# Patient Record
Sex: FEMALE | Race: WHITE
[De-identification: names, ages, dates, MRNs, and addresses within clinical notes are randomized per-mention and may not be internally consistent; named-entity substitution may affect disease eponyms.]

---

## 2019-01-22 ENCOUNTER — HOSPITAL ENCOUNTER (INPATIENT)
Dept: HOSPITAL 26 - MED | Age: 59
LOS: 1 days | Discharge: NURSING, REHAB OR LONG-TERM CARE | DRG: 919 | End: 2019-01-23
Attending: FAMILY MEDICINE | Admitting: FAMILY MEDICINE
Payer: COMMERCIAL

## 2019-01-22 VITALS — DIASTOLIC BLOOD PRESSURE: 43 MMHG | SYSTOLIC BLOOD PRESSURE: 93 MMHG

## 2019-01-22 VITALS — DIASTOLIC BLOOD PRESSURE: 69 MMHG | SYSTOLIC BLOOD PRESSURE: 133 MMHG

## 2019-01-22 VITALS — BODY MASS INDEX: 26.4 KG/M2 | HEIGHT: 63 IN | WEIGHT: 149 LBS

## 2019-01-22 VITALS — DIASTOLIC BLOOD PRESSURE: 50 MMHG | SYSTOLIC BLOOD PRESSURE: 128 MMHG

## 2019-01-22 VITALS — DIASTOLIC BLOOD PRESSURE: 55 MMHG | SYSTOLIC BLOOD PRESSURE: 116 MMHG

## 2019-01-22 DIAGNOSIS — I13.2: ICD-10-CM

## 2019-01-22 DIAGNOSIS — Z83.3: ICD-10-CM

## 2019-01-22 DIAGNOSIS — Z90.710: ICD-10-CM

## 2019-01-22 DIAGNOSIS — D63.8: ICD-10-CM

## 2019-01-22 DIAGNOSIS — I69.320: ICD-10-CM

## 2019-01-22 DIAGNOSIS — E11.22: ICD-10-CM

## 2019-01-22 DIAGNOSIS — I69.391: ICD-10-CM

## 2019-01-22 DIAGNOSIS — K59.00: ICD-10-CM

## 2019-01-22 DIAGNOSIS — R13.10: ICD-10-CM

## 2019-01-22 DIAGNOSIS — E11.319: ICD-10-CM
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
## 2019-01-22 DIAGNOSIS — I50.43: ICD-10-CM
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
## 2019-01-22 DIAGNOSIS — I69.351: ICD-10-CM

## 2019-01-22 DIAGNOSIS — N17.0: ICD-10-CM

## 2019-01-22 DIAGNOSIS — F32.9: ICD-10-CM

## 2019-01-22 DIAGNOSIS — Z99.2: ICD-10-CM

## 2019-01-22 DIAGNOSIS — Z88.5: ICD-10-CM

## 2019-01-22 DIAGNOSIS — Z79.84: ICD-10-CM

## 2019-01-22 DIAGNOSIS — K21.9: ICD-10-CM

## 2019-01-22 DIAGNOSIS — T85.528A: Primary | ICD-10-CM

## 2019-01-22 DIAGNOSIS — L30.9: ICD-10-CM

## 2019-01-22 DIAGNOSIS — N18.6: ICD-10-CM

## 2019-01-22 DIAGNOSIS — J44.9: ICD-10-CM

## 2019-01-22 DIAGNOSIS — F41.9: ICD-10-CM

## 2019-01-22 DIAGNOSIS — E78.00: ICD-10-CM

## 2019-01-22 DIAGNOSIS — E78.5: ICD-10-CM

## 2019-01-22 DIAGNOSIS — E44.0: ICD-10-CM

## 2019-01-22 LAB
ALBUMIN FLD-MCNC: 2.9 G/DL (ref 3.4–5)
AMYLASE SERPL-CCNC: 38 U/L (ref 25–115)
ANION GAP SERPL CALCULATED.3IONS-SCNC: 8.8 MMOL/L (ref 8–16)
AST SERPL-CCNC: 34 U/L (ref 15–37)
BASOPHILS # BLD AUTO: 0.1 K/UL (ref 0–0.22)
BASOPHILS NFR BLD AUTO: 1 % (ref 0–2)
BILIRUB SERPL-MCNC: 0.6 MG/DL (ref 0–1)
BUN SERPL-MCNC: 44 MG/DL (ref 7–18)
CHLORIDE SERPL-SCNC: 99 MMOL/L (ref 98–107)
CHOLEST/HDLC SERPL: 2 {RATIO} (ref 1–4.5)
CO2 SERPL-SCNC: 33.2 MMOL/L (ref 21–32)
CREAT SERPL-MCNC: 5 MG/DL (ref 0.6–1.3)
EOSINOPHIL # BLD AUTO: 0.5 K/UL (ref 0–0.4)
EOSINOPHIL NFR BLD AUTO: 6.1 % (ref 0–4)
ERYTHROCYTE [DISTWIDTH] IN BLOOD BY AUTOMATED COUNT: 17.9 % (ref 11.6–13.7)
GFR SERPL CREATININE-BSD FRML MDRD: 11 ML/MIN (ref 90–?)
GLUCOSE SERPL-MCNC: 167 MG/DL (ref 74–106)
HCT VFR BLD AUTO: 38.8 % (ref 36–48)
HDLC SERPL-MCNC: 45 MG/DL (ref 40–60)
HGB BLD-MCNC: 11.8 G/DL (ref 12–16)
LDLC SERPL CALC-MCNC: 32 MG/DL (ref 60–100)
LIPASE SERPL-CCNC: 73 U/L (ref 73–393)
LYMPHOCYTES # BLD AUTO: 1 K/UL (ref 2.5–16.5)
LYMPHOCYTES NFR BLD AUTO: 12.4 % (ref 20.5–51.1)
MAGNESIUM SERPL-MCNC: 2.4 MG/DL (ref 1.8–2.4)
MCH RBC QN AUTO: 26 PG (ref 27–31)
MCHC RBC AUTO-ENTMCNC: 31 G/DL (ref 33–37)
MCV RBC AUTO: 84.2 FL (ref 80–94)
MONOCYTES # BLD AUTO: 0.6 K/UL (ref 0.8–1)
MONOCYTES NFR BLD AUTO: 7.3 % (ref 1.7–9.3)
NEUTROPHILS # BLD AUTO: 6 K/UL (ref 1.8–7.7)
NEUTROPHILS NFR BLD AUTO: 73.2 % (ref 42.2–75.2)
PHOSPHATE SERPL-MCNC: 4.7 MG/DL (ref 2.5–4.9)
PLATELET # BLD AUTO: 182 K/UL (ref 140–450)
POTASSIUM SERPL-SCNC: 5 MMOL/L (ref 3.5–5.1)
PROTHROMBIN TIME: 11 SECS (ref 10.8–13.4)
RBC # BLD AUTO: 4.61 MIL/UL (ref 4.2–5.4)
SODIUM SERPL-SCNC: 136 MMOL/L (ref 136–145)
T4 FREE SERPL-MCNC: 0.83 NG/DL (ref 0.76–1.46)
TRIGL SERPL-MCNC: 63 MG/DL (ref 30–150)
TSH SERPL DL<=0.05 MIU/L-ACNC: 2.68 UIU/ML (ref 0.34–3.74)
WBC # BLD AUTO: 8.2 K/UL (ref 4.8–10.8)

## 2019-01-22 PROCEDURE — 5A1D70Z PERFORMANCE OF URINARY FILTRATION, INTERMITTENT, LESS THAN 6 HOURS PER DAY: ICD-10-PCS

## 2019-01-22 RX ADMIN — INSULIN LISPRO PRN UNITS: 100 INJECTION, SOLUTION INTRAVENOUS; SUBCUTANEOUS at 18:18

## 2019-01-22 RX ADMIN — Medication SCH DEV: at 11:30

## 2019-01-22 RX ADMIN — Medication SCH ML: at 21:00

## 2019-01-22 RX ADMIN — INSULIN LISPRO PRN UNITS: 100 INJECTION, SOLUTION INTRAVENOUS; SUBCUTANEOUS at 22:11

## 2019-01-22 RX ADMIN — Medication SCH MG: at 17:25

## 2019-01-22 RX ADMIN — Medication SCH ML: at 17:26

## 2019-01-22 RX ADMIN — Medication SCH MG: at 14:40

## 2019-01-22 RX ADMIN — SODIUM CHLORIDE SCH MLS/HR: 9 INJECTION, SOLUTION INTRAVENOUS at 13:44

## 2019-01-22 RX ADMIN — Medication SCH DEV: at 21:00

## 2019-01-22 RX ADMIN — Medication SCH DEV: at 16:30

## 2019-01-22 RX ADMIN — Medication SCH ML: at 14:46

## 2019-01-22 NOTE — NUR
PT SLEEPING COMFORTABLY, NO DISTRESS NOTED. WILL CONTINUE TO MONITOR.

-------------------------------------------------------------------------------

Addendum: 01/23/19 at 0245 by Karol Nettlse RN

-------------------------------------------------------------------------------

WRONG DATE

## 2019-01-22 NOTE — NUR
DR. LARSEN, AND DR. GUALLPA AT BEDSIDE REVIEWING PLAN OF CARE WITH PATIENT. DAUGHTER AT 
BEDSIDE. WILL CONTINUE TO MONITOR.

## 2019-01-22 NOTE — NUR
RECEIVED FROM PREVIOUS SHIFT AM NURSE BEDSIDE REPORT. PT IS AWAKE, APHASIC, BEDREST, CVA R 
SIDED WEAKNESS.  ENDORSED TO TAKE ONLY 1 MORE PHOTO OF ANTERIOR CHEST, AS PHOTOS WERE DONE 
EARLIER IN THE AM SHIFT. PT PLACED IN LOWEST POSITION, CALL LIGHTS WITHIN REACH. WILL 
CONTINUE TO MONITOR.

## 2019-01-22 NOTE — NUR
PATIENT COMPLAINS OF ITCHING, LOTION APPLIED TO BODY. PATIENT ABLE TO SLEEP AFTER 
APPLICATION OF LOTION. WILL CONTINUE TO MONITOR.

## 2019-01-22 NOTE — NUR
Patient will be admitted to care of DR STATON . Admited to [facility] .  Will go to room 
114. Belongings list completed.  Report to MIRACLE WRIGHT.

## 2019-01-22 NOTE — NUR
PATIENT LYING DOWN IN BED SLEEPING, AROUSABLE BY VOICE. NO DISTRESS NOTED. DENIES ANY PAIN. 
FLACC 0. WILL CONTINUE TO MONITOR.

## 2019-01-22 NOTE — NUR
PT BIB BY EMS FROM [facility]. PER EMS PT PULLED OUT HER G-TUBE ABOUT AN 
HOUR AGO PRIOR TO ARRIVAL TO ER. G-TUBE SITE CLEAN AND DRY. PATIENT IS AWAKE 
AND ALERT VSS; PATIENT POSITIONED FOR COMFORT; HOB ELEVATED; BEDRAILS UP X2; 
BED DOWN. ER MD MADE AWARE OF PT STATUS.

## 2019-01-23 VITALS — DIASTOLIC BLOOD PRESSURE: 50 MMHG | SYSTOLIC BLOOD PRESSURE: 100 MMHG

## 2019-01-23 VITALS — SYSTOLIC BLOOD PRESSURE: 99 MMHG | DIASTOLIC BLOOD PRESSURE: 58 MMHG

## 2019-01-23 VITALS — SYSTOLIC BLOOD PRESSURE: 118 MMHG | DIASTOLIC BLOOD PRESSURE: 52 MMHG

## 2019-01-23 VITALS — SYSTOLIC BLOOD PRESSURE: 131 MMHG | DIASTOLIC BLOOD PRESSURE: 70 MMHG

## 2019-01-23 VITALS — SYSTOLIC BLOOD PRESSURE: 118 MMHG | DIASTOLIC BLOOD PRESSURE: 60 MMHG

## 2019-01-23 LAB
ANION GAP SERPL CALCULATED.3IONS-SCNC: 12.5 MMOL/L (ref 8–16)
ANION GAP SERPL CALCULATED.3IONS-SCNC: 8.6 MMOL/L (ref 8–16)
BASOPHILS # BLD AUTO: 0.1 K/UL (ref 0–0.22)
BASOPHILS NFR BLD AUTO: 1.3 % (ref 0–2)
BUN SERPL-MCNC: 34 MG/DL (ref 7–18)
BUN SERPL-MCNC: 60 MG/DL (ref 7–18)
CHLORIDE SERPL-SCNC: 100 MMOL/L (ref 98–107)
CHLORIDE SERPL-SCNC: 103 MMOL/L (ref 98–107)
CO2 SERPL-SCNC: 29.5 MMOL/L (ref 21–32)
CO2 SERPL-SCNC: 31 MMOL/L (ref 21–32)
CREAT SERPL-MCNC: 4.6 MG/DL (ref 0.6–1.3)
CREAT SERPL-MCNC: 6.1 MG/DL (ref 0.6–1.3)
EOSINOPHIL # BLD AUTO: 0.9 K/UL (ref 0–0.4)
EOSINOPHIL NFR BLD AUTO: 10.8 % (ref 0–4)
ERYTHROCYTE [DISTWIDTH] IN BLOOD BY AUTOMATED COUNT: 18.1 % (ref 11.6–13.7)
GFR SERPL CREATININE-BSD FRML MDRD: 13 ML/MIN (ref 90–?)
GFR SERPL CREATININE-BSD FRML MDRD: 9 ML/MIN (ref 90–?)
GLUCOSE SERPL-MCNC: 111 MG/DL (ref 74–106)
GLUCOSE SERPL-MCNC: 203 MG/DL (ref 74–106)
HCT VFR BLD AUTO: 38.8 % (ref 36–48)
HGB BLD-MCNC: 11.9 G/DL (ref 12–16)
LYMPHOCYTES # BLD AUTO: 1.3 K/UL (ref 2.5–16.5)
LYMPHOCYTES NFR BLD AUTO: 16.5 % (ref 20.5–51.1)
MAGNESIUM SERPL-MCNC: 2.5 MG/DL (ref 1.8–2.4)
MCH RBC QN AUTO: 26 PG (ref 27–31)
MCHC RBC AUTO-ENTMCNC: 31 G/DL (ref 33–37)
MCV RBC AUTO: 84.3 FL (ref 80–94)
MONOCYTES # BLD AUTO: 0.8 K/UL (ref 0.8–1)
MONOCYTES NFR BLD AUTO: 9.8 % (ref 1.7–9.3)
NEUTROPHILS # BLD AUTO: 5 K/UL (ref 1.8–7.7)
NEUTROPHILS NFR BLD AUTO: 61.6 % (ref 42.2–75.2)
PHOSPHATE SERPL-MCNC: 5.6 MG/DL (ref 2.5–4.9)
PLATELET # BLD AUTO: 179 K/UL (ref 140–450)
POTASSIUM SERPL-SCNC: 4 MMOL/L (ref 3.5–5.1)
POTASSIUM SERPL-SCNC: 5.6 MMOL/L (ref 3.5–5.1)
RBC # BLD AUTO: 4.6 MIL/UL (ref 4.2–5.4)
SODIUM SERPL-SCNC: 137 MMOL/L (ref 136–145)
SODIUM SERPL-SCNC: 138 MMOL/L (ref 136–145)
WBC # BLD AUTO: 8.1 K/UL (ref 4.8–10.8)

## 2019-01-23 RX ADMIN — METOCLOPRAMIDE HYDROCHLORIDE SCH MG: 5 SOLUTION ORAL at 08:42

## 2019-01-23 RX ADMIN — Medication SCH DEV: at 05:55

## 2019-01-23 RX ADMIN — Medication SCH DEV: at 16:37

## 2019-01-23 RX ADMIN — Medication SCH ML: at 16:31

## 2019-01-23 RX ADMIN — METOCLOPRAMIDE HYDROCHLORIDE SCH MG: 5 SOLUTION ORAL at 12:19

## 2019-01-23 RX ADMIN — INSULIN LISPRO PRN UNITS: 100 INJECTION, SOLUTION INTRAVENOUS; SUBCUTANEOUS at 16:38

## 2019-01-23 RX ADMIN — SODIUM CHLORIDE SCH MLS/HR: 9 INJECTION, SOLUTION INTRAVENOUS at 09:22

## 2019-01-23 RX ADMIN — Medication SCH ML: at 12:23

## 2019-01-23 RX ADMIN — INSULIN LISPRO PRN UNITS: 100 INJECTION, SOLUTION INTRAVENOUS; SUBCUTANEOUS at 12:16

## 2019-01-23 RX ADMIN — METOCLOPRAMIDE HYDROCHLORIDE SCH MG: 5 SOLUTION ORAL at 16:31

## 2019-01-23 RX ADMIN — Medication SCH ML: at 08:57

## 2019-01-23 RX ADMIN — Medication SCH DEV: at 12:14

## 2019-01-23 NOTE — NUR
RECEIVED BEDSIDE REPORT FROM AM SHIFT.PP AWAKE WITH DAUGHTER AT BEDSIDE, RESPIRATIONS EVEN 
AND UNLABORED,  APHASIC. ABLE TO FOLLOW SIMPLE COMMANDS. HX CVA WITH RT SIDED WEAKNESS. ON 
BEDREST. MULTIPLE SCABS OVER THE BODY AND ABRASIONS ON THE RT CHEST AND LT CHEST, LEFT 
CHEEK. LT UA SHUNT. IV SITE PATENT AND ASYMPTOMATIC. ALL SAFETY PRECAUTIONS IN PLACE, WILL 
CONTINUE TO MONITOR. PT IS AWAITING TRANSFER TO [facility].

## 2019-01-23 NOTE — NUR
PATIENT HAS BEEN SCREENED AND CATEGORIZED AS HIGH NUTRITION RISK. PATIENT WILL BE SEEN 
WITHIN 1-2 DAYS OF ADMISSION.



01/23/19



KEVIN NIEVES RD

## 2019-01-23 NOTE — NUR
[facility] ARRIVED, PLACED PT IN [facility]. VITAL SIGNS WITHIN NORMAL LIMITS UPON 
TRANSPORT.

## 2019-01-23 NOTE — NUR
D/C PAPERWORK AND MEDICATION RECONCILIATION TEACHING GIVEN TO DAUGHTER ADAM AT BEDSIDE. 
DAUGHTER VERBALIZED COMPLETE UNDERSTANDING OF ALL D/C TEACHING. WOUND PHOTOS TAKEN.

## 2019-01-23 NOTE — NUR
TOOK VITAL SIGNS, WITHIN NORMAL LIMITS. TOOK BLOOD SUGAR WITH RESULT , 2 UNITS GIVEN 
OF HUMALOG. DAUGHTER ADAM INFORMED

## 2019-01-23 NOTE — NUR
COULD NOT LOCATE ARTIFICIAL EYE DROPS IN PT CASSETTE OR PT ROOM. NOTIFIED PHARMACY. PER 
PHARMACY, LOOK IN PATIENT'S ROOM AGAIN. IF UNABLE TO LOCATE, WILL NOTIFY PHARMACY AGAIN.

## 2019-01-23 NOTE — NUR
CALLED AND VERIFIED WITH [facility] THAT THEY ARE AWARE OF HD ORDER FOR TODAY. PER BARAJAS, HD 
RN IS AWARE AND WILL BE HERE TODAY. NO ETA.

## 2019-01-23 NOTE — NUR
NOTIFIED DR. ORDAZ THAT THERE IS LANTUS 20 U AND ALSO LANTUS 12 U ORDERED FOR 0900. PER DR. ORDAZ, ADMINISTER LANTUS 20 U (ORDER FROM DR. MAY).

## 2019-01-23 NOTE — NUR
RECEIVED BEDSIDE REPORT FROM NIGHT SHIFT RN. PT IS SLEEPING IN BED, RESPIRATIONS EVEN AND 
UNLABORED, AROUSABLE BY VOICE. APHASIC. ABLE TO FOLLOW SIMPLE COMMANDS. HX CVA WITH RT SIDED 
WEAKNESS. ON BEDREST. MULTIPLE SCABS OVER THE BODY AND ABRASIONS ON THE RT CHEST AND LT 
CHEST, LEFT CHEEK. LT UA SHUNT. IV SITE PATENT AND ASYMPTOMATIC. ALL SAFETY PRECAUTIONS IN 
PLACE, WILL CONTINUE TO MONITOR.

## 2019-01-23 NOTE — NUR
GAVE REPORT TO MIRACLE AQUINO AT [facility]. REVIEWED CC, DX, HX, MEDICATIONS, PLAN OF 
CARE. INFORMED KENIA RAUSCH THAT PATIENT RECEIVED HD TODAY WITH 3L REMOVED. PENDING BMP 
RESULTS NOW (POST-HEMODIALYSIS). KENIA RAUSCH VERBALIZED COMPLETE UNDERSTANDING.

## 2019-01-23 NOTE — NUR
Note:



Per Samia from [facility] [phone], patient is on a 7 day bed hold and is one of 
their long term patients. She stated patient's daughter Jeanie Toth is her health care 
decision maker.



Per patient's nurse Randi, patient is not on isolation. I faxed patient's medical 
information to [facility]. Per Samia from [facility], patient may return to room 
6A today anytime, accepting physician is Luís Nuñez, Director [place] [facility].

## 2019-01-23 NOTE — NUR
PT TURNED Q2 ABLE TO TOLERATE PROCEDURE, NO DISTRESS AT THIS TIME. SLEEPING. WILL CONTINUE 
TO MONITOR.

## 2019-01-23 NOTE — NUR
WOUND CARE EVALUATION NOTES:

REASON FOR EVALUATION:  R ABDOMINAL WOUND

SKIN ASSESSMENT DONE ON THIS 59 Y/O FEMALE PATIENT ADMITTED TO [facility], WITH INITIAL 
DIAGNOSIS OF DT DISPLACEMENT.  PAST MEDICAL HISTORY INCLUDE HYPERTENSION, DYSPHAGIA, ESRD ON 
HD ALL ABOVE INFORMATION WAS OBTAINED FROM THE ADMISSION H&P. SKIN WARM TO TOUCH.  ON ROOM 
AIR.  NEED ONE PERSON ASSISTANCE IN TURNING.  INITIAL PLAN OF CARE AND PRESSURE PREVENTIVE 
MEASURES DISCUSSED.



INTEGUMENTARY:

-CHEST SCATTERED MULTIPLE PARTIAL THICKNESS LOSS OF SKIN WITH UNKNOWN ETIOLOGY, SOME WOUND 
BED ARE PINK AND DRY SOME ARE SCABS

-MID ABDOMENAL SURGICAL WOUND (OLD GT SITE) 1X1 CM, SMALL AMOUNT PURULENT DRAINAGE, MILD 
ODOR, BENJAMÍN WOUND SKIN INTACT, REDNESS NOTICE.



RECOMMENDATIONS:

-CLEANSE MID ABDOMINAL SURGICAL WOUND WITH NS. APT DRY, APPLY HYDROGEL, AND COVER WITH DRY 
DRESSING AND SECURE WITH TAPE QD AND PRN IF SOILING.

-TURN AND REPOSITION PATIENT Q 2H 

-ASSESS AND MONITOR SKIN CONDITION DURING POSITION CHANGE

-OFFLOAD BILATERAL HEELS BY PLACING PILLOWS UNDER CALVES AT ALL TIMES, UNLESS OTHERWISE 
CONTRAINDICATED

-PRESSURE REDISTRIBUTION SURFACE THERAPY

-KEEP SKIN CLEAN AND DRY AT ALL TIMES. 

 

RECOMMENDATIONS DISCUSSED WITH PRIMARY RN 

WILL FOLLOW UP PATIENT Q7 DAYS AND PRN.  PLEASE CONTACT WOUND CARE NURSE FOR ANY QUESTIONS 
AND CHANGES IN SKIN CONDITION.

## 2019-01-23 NOTE — NUR
LEFT MESSAGE WITH DAUGHTER ADAM REGARDING PLANS TO D/C AFTER HD TODAY. DAUGHTER CALLED 
EARLIER ASKING ABOUT D/C PLANS.

## 2019-01-23 NOTE — NUR
HEMODIALYSIS COMPLETE. PER HD RN, 3 LITERS REMOVED. PT VITALS STABLE. 

-------------------------------------------------------------------------------

Addendum: 01/23/19 at 1523 by Randi Guidry RN

-------------------------------------------------------------------------------

NOTIFIED DR. MAY THAT HD COMPLETE-  TO ORDER BMP.

## 2019-01-23 NOTE — NUR
BP RETAKEN /80 MMHG. PT IN STABLE POSITION AT THIS TIME EXCEPT THAT PT HAS DIALYSIS 
DUE, AND DR ORDAZ  AWARE THAT PT HAS NO URINE OUTPUT LAST NIGHT. SHE WAS PLACED AS NPO POST 
MD., BUT WITH KVO IVF RATE. WILL ENDORSE TO NEXT SHIFT

## 2019-01-24 NOTE — NUR
addendum: blood culture results sent back to lab. to be sent to Psychiatric. patient was admitted and discharged back to Psychiatric

## 2019-01-25 ENCOUNTER — HOSPITAL ENCOUNTER (INPATIENT)
Dept: HOSPITAL 26 - MED | Age: 59
LOS: 4 days | Discharge: NURSING, REHAB OR LONG-TERM CARE | DRG: 299 | End: 2019-01-29
Attending: FAMILY MEDICINE | Admitting: FAMILY MEDICINE
Payer: COMMERCIAL

## 2019-01-25 VITALS — BODY MASS INDEX: 29.41 KG/M2 | HEIGHT: 63 IN | WEIGHT: 166 LBS

## 2019-01-25 VITALS — DIASTOLIC BLOOD PRESSURE: 65 MMHG | SYSTOLIC BLOOD PRESSURE: 126 MMHG

## 2019-01-25 DIAGNOSIS — N39.0: ICD-10-CM

## 2019-01-25 DIAGNOSIS — Z88.5: ICD-10-CM

## 2019-01-25 DIAGNOSIS — E44.0: ICD-10-CM

## 2019-01-25 DIAGNOSIS — F41.9: ICD-10-CM

## 2019-01-25 DIAGNOSIS — Z93.1: ICD-10-CM

## 2019-01-25 DIAGNOSIS — Z79.82: ICD-10-CM

## 2019-01-25 DIAGNOSIS — N18.6: ICD-10-CM

## 2019-01-25 DIAGNOSIS — F32.9: ICD-10-CM

## 2019-01-25 DIAGNOSIS — K80.20: ICD-10-CM

## 2019-01-25 DIAGNOSIS — N17.0: ICD-10-CM

## 2019-01-25 DIAGNOSIS — I69.351: ICD-10-CM

## 2019-01-25 DIAGNOSIS — Z83.3: ICD-10-CM

## 2019-01-25 DIAGNOSIS — Z79.84: ICD-10-CM

## 2019-01-25 DIAGNOSIS — E11.52: Primary | ICD-10-CM

## 2019-01-25 DIAGNOSIS — I69.320: ICD-10-CM

## 2019-01-25 DIAGNOSIS — I13.2: ICD-10-CM

## 2019-01-25 DIAGNOSIS — I50.43: ICD-10-CM

## 2019-01-25 DIAGNOSIS — L03.012: ICD-10-CM

## 2019-01-25 DIAGNOSIS — Z66: ICD-10-CM

## 2019-01-25 DIAGNOSIS — E83.41: ICD-10-CM

## 2019-01-25 DIAGNOSIS — I42.9: ICD-10-CM

## 2019-01-25 DIAGNOSIS — Z99.2: ICD-10-CM

## 2019-01-25 DIAGNOSIS — Z82.49: ICD-10-CM

## 2019-01-25 DIAGNOSIS — I96: ICD-10-CM

## 2019-01-25 DIAGNOSIS — D64.9: ICD-10-CM

## 2019-01-25 DIAGNOSIS — Z90.710: ICD-10-CM

## 2019-01-25 DIAGNOSIS — L30.9: ICD-10-CM

## 2019-01-25 DIAGNOSIS — E78.5: ICD-10-CM

## 2019-01-25 DIAGNOSIS — E78.00: ICD-10-CM

## 2019-01-25 DIAGNOSIS — J44.9: ICD-10-CM

## 2019-01-25 DIAGNOSIS — E87.5: ICD-10-CM

## 2019-01-25 DIAGNOSIS — Z87.891: ICD-10-CM

## 2019-01-25 DIAGNOSIS — E11.22: ICD-10-CM

## 2019-01-25 DIAGNOSIS — Z79.899: ICD-10-CM

## 2019-01-25 DIAGNOSIS — E83.39: ICD-10-CM

## 2019-01-25 DIAGNOSIS — E11.319: ICD-10-CM

## 2019-01-25 LAB
AMORPH SED URNS QL MICRO: (no result) /HPF
APPEARANCE UR: (no result)
APPEARANCE UR: (no result)
BASOPHILS # BLD AUTO: 0.1 K/UL (ref 0–0.22)
BASOPHILS NFR BLD AUTO: 1.4 % (ref 0–2)
BILIRUB UR QL STRIP: NEGATIVE
BILIRUB UR QL STRIP: NEGATIVE
COLOR UR: YELLOW
COLOR UR: YELLOW
EOSINOPHIL # BLD AUTO: 0.6 K/UL (ref 0–0.4)
EOSINOPHIL NFR BLD AUTO: 6.8 % (ref 0–4)
ERYTHROCYTE [DISTWIDTH] IN BLOOD BY AUTOMATED COUNT: 17.8 % (ref 11.6–13.7)
GLUCOSE UR STRIP-MCNC: NEGATIVE MG/DL
GLUCOSE UR STRIP-MCNC: NEGATIVE MG/DL
HCT VFR BLD AUTO: 38.2 % (ref 36–48)
HGB BLD-MCNC: 11.6 G/DL (ref 12–16)
HGB UR QL STRIP: (no result)
HGB UR QL STRIP: NEGATIVE
LEUKOCYTE ESTERASE UR QL STRIP: (no result)
LEUKOCYTE ESTERASE UR QL STRIP: NEGATIVE
LYMPHOCYTES # BLD AUTO: 1.3 K/UL (ref 2.5–16.5)
LYMPHOCYTES NFR BLD AUTO: 15.3 % (ref 20.5–51.1)
MCH RBC QN AUTO: 26 PG (ref 27–31)
MCHC RBC AUTO-ENTMCNC: 30 G/DL (ref 33–37)
MCV RBC AUTO: 84.3 FL (ref 80–94)
MONOCYTES # BLD AUTO: 0.8 K/UL (ref 0.8–1)
MONOCYTES NFR BLD AUTO: 9.3 % (ref 1.7–9.3)
NEUTROPHILS # BLD AUTO: 5.5 K/UL (ref 1.8–7.7)
NEUTROPHILS NFR BLD AUTO: 67.2 % (ref 42.2–75.2)
NITRITE UR QL STRIP: NEGATIVE
NITRITE UR QL STRIP: NEGATIVE
PH UR STRIP: 6.5 [PH] (ref 5–9)
PH UR STRIP: 8.5 [PH] (ref 5–9)
PLATELET # BLD AUTO: 238 K/UL (ref 140–450)
RBC # BLD AUTO: 4.53 MIL/UL (ref 4.2–5.4)
RBC #/AREA URNS HPF: (no result) /HPF (ref 0–5)
RBC #/AREA URNS HPF: (no result) /HPF (ref 0–5)
WBC # BLD AUTO: 8.3 K/UL (ref 4.8–10.8)
WBC,URINE: (no result) /HPF (ref 0–5)
WBC,URINE: (no result) /HPF (ref 0–5)

## 2019-01-25 PROCEDURE — C1758 CATHETER, URETERAL: HCPCS

## 2019-01-25 NOTE — NUR
PT BIBA FROM [facility] FOR WOUND ON L FIFTH FINGER. PT DAUGHTER STATES 
NAIL FELL OFF ABOUT 1 MONTH AGO, AND WOUND HAS GOTEN WORSE. WOUND IS HAS BLACK 
ESCHAR PRESENT. PT DOES NOT EXHIBIT ANY FACIAL GRIMACING, RESLTESSNESS, OR 
CRYING. PT IS AAOX1, PT DAUGHTER STATES THAT IS HER NORMAL SINCE CVA. PT HAS R 
SIDED WEAKNESS. PRESENCE OF RED SCALEY RASH ON PT PALMS, CHEST, AND BACK. ER MD 
TO SEE PT. SAFETY PRECAUTIONS IN PLACE, WILL CONTINUE TO MONITOR.



MEDHX:HTN, CVA, RENAL FAILURE, HEMODIALYSIS, DM II

## 2019-01-26 VITALS — DIASTOLIC BLOOD PRESSURE: 82 MMHG | SYSTOLIC BLOOD PRESSURE: 148 MMHG

## 2019-01-26 VITALS — SYSTOLIC BLOOD PRESSURE: 150 MMHG | DIASTOLIC BLOOD PRESSURE: 83 MMHG

## 2019-01-26 VITALS — DIASTOLIC BLOOD PRESSURE: 64 MMHG | SYSTOLIC BLOOD PRESSURE: 131 MMHG

## 2019-01-26 VITALS — DIASTOLIC BLOOD PRESSURE: 64 MMHG | SYSTOLIC BLOOD PRESSURE: 118 MMHG

## 2019-01-26 VITALS — SYSTOLIC BLOOD PRESSURE: 121 MMHG | DIASTOLIC BLOOD PRESSURE: 59 MMHG

## 2019-01-26 VITALS — SYSTOLIC BLOOD PRESSURE: 145 MMHG | DIASTOLIC BLOOD PRESSURE: 74 MMHG

## 2019-01-26 LAB
ALBUMIN FLD-MCNC: 3.1 G/DL (ref 3.4–5)
ANION GAP SERPL CALCULATED.3IONS-SCNC: 11.2 MMOL/L (ref 8–16)
ANION GAP SERPL CALCULATED.3IONS-SCNC: 13.9 MMOL/L (ref 8–16)
AST SERPL-CCNC: 68 U/L (ref 15–37)
BASOPHILS # BLD AUTO: 0.1 K/UL (ref 0–0.22)
BASOPHILS NFR BLD AUTO: 1.1 % (ref 0–2)
BILIRUB SERPL-MCNC: 0.5 MG/DL (ref 0–1)
BUN SERPL-MCNC: 35 MG/DL (ref 7–18)
BUN SERPL-MCNC: 37 MG/DL (ref 7–18)
CHLORIDE SERPL-SCNC: 100 MMOL/L (ref 98–107)
CHLORIDE SERPL-SCNC: 104 MMOL/L (ref 98–107)
CO2 SERPL-SCNC: 32 MMOL/L (ref 21–32)
CO2 SERPL-SCNC: 32 MMOL/L (ref 21–32)
CREAT SERPL-MCNC: 4.1 MG/DL (ref 0.6–1.3)
CREAT SERPL-MCNC: 4.3 MG/DL (ref 0.6–1.3)
EOSINOPHIL # BLD AUTO: 0.5 K/UL (ref 0–0.4)
EOSINOPHIL NFR BLD AUTO: 7.5 % (ref 0–4)
ERYTHROCYTE [DISTWIDTH] IN BLOOD BY AUTOMATED COUNT: 18 % (ref 11.6–13.7)
GFR SERPL CREATININE-BSD FRML MDRD: 14 ML/MIN (ref 90–?)
GFR SERPL CREATININE-BSD FRML MDRD: 14 ML/MIN (ref 90–?)
GLUCOSE SERPL-MCNC: 133 MG/DL (ref 74–106)
GLUCOSE SERPL-MCNC: 145 MG/DL (ref 74–106)
HCT VFR BLD AUTO: 36.9 % (ref 36–48)
HGB BLD-MCNC: 11.1 G/DL (ref 12–16)
LYMPHOCYTES # BLD AUTO: 1 K/UL (ref 2.5–16.5)
LYMPHOCYTES NFR BLD AUTO: 15.9 % (ref 20.5–51.1)
MAGNESIUM SERPL-MCNC: 2.3 MG/DL (ref 1.8–2.4)
MCH RBC QN AUTO: 26 PG (ref 27–31)
MCHC RBC AUTO-ENTMCNC: 30 G/DL (ref 33–37)
MCV RBC AUTO: 85 FL (ref 80–94)
MONOCYTES # BLD AUTO: 0.6 K/UL (ref 0.8–1)
MONOCYTES NFR BLD AUTO: 10.2 % (ref 1.7–9.3)
NEUTROPHILS # BLD AUTO: 4 K/UL (ref 1.8–7.7)
NEUTROPHILS NFR BLD AUTO: 65.3 % (ref 42.2–75.2)
PHOSPHATE SERPL-MCNC: 3.7 MG/DL (ref 2.5–4.9)
PLATELET # BLD AUTO: 191 K/UL (ref 140–450)
POTASSIUM SERPL-SCNC: 3.9 MMOL/L (ref 3.5–5.1)
POTASSIUM SERPL-SCNC: 4.2 MMOL/L (ref 3.5–5.1)
PROTHROMBIN TIME: 10.7 SECS (ref 10.8–13.4)
RBC # BLD AUTO: 4.34 MIL/UL (ref 4.2–5.4)
SODIUM SERPL-SCNC: 142 MMOL/L (ref 136–145)
SODIUM SERPL-SCNC: 143 MMOL/L (ref 136–145)
WBC # BLD AUTO: 6.1 K/UL (ref 4.8–10.8)

## 2019-01-26 RX ADMIN — INSULIN GLARGINE SCH UNITS: 100 INJECTION, SOLUTION SUBCUTANEOUS at 09:00

## 2019-01-26 RX ADMIN — MIRTAZAPINE SCH MG: 15 TABLET, FILM COATED ORAL at 20:43

## 2019-01-26 RX ADMIN — SODIUM CHLORIDE SCH MLS/HR: 9 INJECTION, SOLUTION INTRAVENOUS at 00:00

## 2019-01-26 RX ADMIN — Medication SCH DEV: at 21:00

## 2019-01-26 RX ADMIN — Medication SCH DEV: at 11:30

## 2019-01-26 RX ADMIN — Medication SCH ML: at 17:58

## 2019-01-26 RX ADMIN — Medication SCH MG: at 09:13

## 2019-01-26 RX ADMIN — Medication SCH MG: at 11:30

## 2019-01-26 RX ADMIN — Medication SCH MG: at 17:56

## 2019-01-26 RX ADMIN — Medication SCH ML: at 13:41

## 2019-01-26 RX ADMIN — Medication SCH DEV: at 16:30

## 2019-01-26 RX ADMIN — Medication SCH DEV: at 06:48

## 2019-01-26 RX ADMIN — Medication SCH DROP: at 20:39

## 2019-01-26 RX ADMIN — Medication SCH ML: at 09:00

## 2019-01-26 RX ADMIN — Medication SCH TAB: at 20:43

## 2019-01-26 RX ADMIN — Medication SCH MG: at 11:14

## 2019-01-26 RX ADMIN — FAMOTIDINE SCH MG: 20 TABLET ORAL at 09:13

## 2019-01-26 RX ADMIN — INSULIN LISPRO PRN UNITS: 100 INJECTION, SOLUTION INTRAVENOUS; SUBCUTANEOUS at 17:56

## 2019-01-26 RX ADMIN — Medication SCH MG: at 07:30

## 2019-01-26 RX ADMIN — Medication SCH MG: at 22:38

## 2019-01-26 RX ADMIN — ATORVASTATIN CALCIUM SCH MG: 20 TABLET, FILM COATED ORAL at 20:41

## 2019-01-26 NOTE — NUR
PT TRANSPORTED TO [facility] UNIT VIA GURNEY AND WAS ESCORTED BY EMT AND OMAR RAUSCH. PT TRANSFERRED 
TO BED . PT AOX1. HER SKIN IS NON INTACT, PT HAS BLACKENED PINKY FINGERNAIL ON LEFT 
HAND AND HAS A RASH ON UPPER BODY ARMS AND CHEST, PICTURES TAKEN. PT HAS DIALYSIS SHUNT ON 
LEFT UPPER ARM INTACT THRILL AND BRUIT FELT.PT ON FALLS PRECAUTION DUE TO SEVERE WEAKNESS ON 
RIGHT SIDE AND PT ORIENTATION AOX1. LABS BEING DRAWN AT BEDSIDE. MRSA ADMISSION SWAB DONE. 
V/S AS FOLLOWS T 98.3 P 84 R 18 B/P 148/82 02 98% WITH R/A.

## 2019-01-26 NOTE — NUR
Patient will be admitted to care of  [facility]. Admited to TELE VIA GOURNEY WITH 
VSS.  Will go to room 117. Belongings list completed.  Report to ODILON RAUSCH.

## 2019-01-26 NOTE — NUR
PT GIVEN DUE MEDS OF LIPITOR , NEPHRO-DEBORAH AND REMERON. PT IN LOW BED WITH ALL FALLS 
PRECAUTIONS IN PLACE.

## 2019-01-26 NOTE — NUR
PT IN BED WITH ALL FALLS PRECAUTIONS IN PLACE. PT QUIET, WITH NO BEHAVIORS AND NO S/S OF 
PAIN OR DISTRESS NOTED. PT HAS N/S RUNNING AS KVO AT 3MLS/HR. IV SITE INTACT AND FLUSHED 
PATENT. V/S AS FOLLOWS T 97.8 P 73 R 18 B/P 121/59 02 99% ON R/A. FINGERSTICK  NO 
COVERAGE NEEDED.

## 2019-01-26 NOTE — NUR
RECEIVED REPORT FROM JOAQUÍN RN DAYSHIFT NURSE AT BEDSIDE FOR CONTINUITY OF CARE, PT IN 
STABLE CONDITION.

## 2019-01-26 NOTE — NUR
RECEIVED PT ASLEEP, AROUSABLE, ORIENTED TO NAME ONLY, CONFUSED. NO SOB NOTED. NO SIGNS OF 
PAIN AT THIS TIME. IV TO RT HAND PATENT AND INTACT. LT ARM AV SHUNT NOTED, BRUITS AND THRILL 
FELT. BLACKENED LEFT 5TH FINGER NOTED, DRY AND OPEN TO AIR. CHEST, DIMINISHED AIR ENTRY TO 
THE BASES. ABDOMEN SOFT, BOWEL SOUNDS PRESENT. PREVIOUS G-TUBE SITE DRY, OPEN TO AIR. RT 
SIDED WEAKNESS NOTED. WILL REPOSITION PT EVERY 2 HRS. BED ON LOWEST POSITION, WITH 3 SIDE 
RAILS RAISED UP, BED ALARM ON. CALL LIGHT WITHIN REACH.

## 2019-01-26 NOTE — NUR
PT AWAKE,WATCHING TV,  NO SOB NOTED, NO COMPLAINTS MADE. ENDORSED TO NEXT SHIFT NURSE FOR 
CONTINUITY OF CARE.

## 2019-01-27 VITALS — SYSTOLIC BLOOD PRESSURE: 121 MMHG | DIASTOLIC BLOOD PRESSURE: 70 MMHG

## 2019-01-27 VITALS — SYSTOLIC BLOOD PRESSURE: 114 MMHG | DIASTOLIC BLOOD PRESSURE: 60 MMHG

## 2019-01-27 VITALS — DIASTOLIC BLOOD PRESSURE: 71 MMHG | SYSTOLIC BLOOD PRESSURE: 152 MMHG

## 2019-01-27 VITALS — SYSTOLIC BLOOD PRESSURE: 142 MMHG | DIASTOLIC BLOOD PRESSURE: 79 MMHG

## 2019-01-27 VITALS — SYSTOLIC BLOOD PRESSURE: 129 MMHG | DIASTOLIC BLOOD PRESSURE: 78 MMHG

## 2019-01-27 VITALS — DIASTOLIC BLOOD PRESSURE: 82 MMHG | SYSTOLIC BLOOD PRESSURE: 141 MMHG

## 2019-01-27 LAB
ALBUMIN FLD-MCNC: 2.9 G/DL (ref 3.4–5)
ANION GAP SERPL CALCULATED.3IONS-SCNC: 14.5 MMOL/L (ref 8–16)
ANION GAP SERPL CALCULATED.3IONS-SCNC: 18.8 MMOL/L (ref 8–16)
AST SERPL-CCNC: 40 U/L (ref 15–37)
BASOPHILS # BLD AUTO: 0.1 K/UL (ref 0–0.22)
BASOPHILS NFR BLD AUTO: 1.3 % (ref 0–2)
BILIRUB SERPL-MCNC: 0.4 MG/DL (ref 0–1)
BUN SERPL-MCNC: 54 MG/DL (ref 7–18)
BUN SERPL-MCNC: 68 MG/DL (ref 7–18)
CHLORIDE SERPL-SCNC: 100 MMOL/L (ref 98–107)
CHLORIDE SERPL-SCNC: 101 MMOL/L (ref 98–107)
CO2 SERPL-SCNC: 24.9 MMOL/L (ref 21–32)
CO2 SERPL-SCNC: 26.8 MMOL/L (ref 21–32)
CREAT SERPL-MCNC: 5.4 MG/DL (ref 0.6–1.3)
CREAT SERPL-MCNC: 6.4 MG/DL (ref 0.6–1.3)
EOSINOPHIL # BLD AUTO: 0.5 K/UL (ref 0–0.4)
EOSINOPHIL NFR BLD AUTO: 7.6 % (ref 0–4)
ERYTHROCYTE [DISTWIDTH] IN BLOOD BY AUTOMATED COUNT: 17.5 % (ref 11.6–13.7)
GFR SERPL CREATININE-BSD FRML MDRD: 10 ML/MIN (ref 90–?)
GFR SERPL CREATININE-BSD FRML MDRD: 9 ML/MIN (ref 90–?)
GLUCOSE SERPL-MCNC: 128 MG/DL (ref 74–106)
GLUCOSE SERPL-MCNC: 130 MG/DL (ref 74–106)
HCT VFR BLD AUTO: 34.8 % (ref 36–48)
HGB BLD-MCNC: 10.7 G/DL (ref 12–16)
LYMPHOCYTES # BLD AUTO: 1.2 K/UL (ref 2.5–16.5)
LYMPHOCYTES NFR BLD AUTO: 17.7 % (ref 20.5–51.1)
MAGNESIUM SERPL-MCNC: 2.5 MG/DL (ref 1.8–2.4)
MCH RBC QN AUTO: 26 PG (ref 27–31)
MCHC RBC AUTO-ENTMCNC: 31 G/DL (ref 33–37)
MCV RBC AUTO: 84.1 FL (ref 80–94)
MONOCYTES # BLD AUTO: 0.6 K/UL (ref 0.8–1)
MONOCYTES NFR BLD AUTO: 9.3 % (ref 1.7–9.3)
NEUTROPHILS # BLD AUTO: 4.4 K/UL (ref 1.8–7.7)
NEUTROPHILS NFR BLD AUTO: 64.1 % (ref 42.2–75.2)
PHOSPHATE SERPL-MCNC: 6.6 MG/DL (ref 2.5–4.9)
PLATELET # BLD AUTO: 200 K/UL (ref 140–450)
POTASSIUM SERPL-SCNC: 5.3 MMOL/L (ref 3.5–5.1)
POTASSIUM SERPL-SCNC: 5.7 MMOL/L (ref 3.5–5.1)
RBC # BLD AUTO: 4.14 MIL/UL (ref 4.2–5.4)
SODIUM SERPL-SCNC: 136 MMOL/L (ref 136–145)
SODIUM SERPL-SCNC: 139 MMOL/L (ref 136–145)
WBC # BLD AUTO: 6.9 K/UL (ref 4.8–10.8)

## 2019-01-27 RX ADMIN — Medication SCH MG: at 08:05

## 2019-01-27 RX ADMIN — Medication SCH DEV: at 11:30

## 2019-01-27 RX ADMIN — Medication SCH MG: at 17:48

## 2019-01-27 RX ADMIN — Medication SCH MG: at 08:04

## 2019-01-27 RX ADMIN — CHLORHEXIDINE GLUCONATE SCH EA: 2 SOLUTION TOPICAL at 18:00

## 2019-01-27 RX ADMIN — SEVELAMER CARBONATE SCH MG: 800 TABLET, FILM COATED ORAL at 17:49

## 2019-01-27 RX ADMIN — ATORVASTATIN CALCIUM SCH MG: 20 TABLET, FILM COATED ORAL at 20:19

## 2019-01-27 RX ADMIN — INSULIN GLARGINE SCH UNITS: 100 INJECTION, SOLUTION SUBCUTANEOUS at 09:00

## 2019-01-27 RX ADMIN — Medication SCH DEV: at 07:30

## 2019-01-27 RX ADMIN — MIRTAZAPINE SCH MG: 15 TABLET, FILM COATED ORAL at 20:19

## 2019-01-27 RX ADMIN — Medication SCH TAB: at 20:19

## 2019-01-27 RX ADMIN — Medication SCH DEV: at 17:14

## 2019-01-27 RX ADMIN — INSULIN LISPRO PRN UNITS: 100 INJECTION, SOLUTION INTRAVENOUS; SUBCUTANEOUS at 13:02

## 2019-01-27 RX ADMIN — Medication SCH DEV: at 20:21

## 2019-01-27 RX ADMIN — Medication SCH MG: at 11:50

## 2019-01-27 RX ADMIN — Medication SCH DROP: at 08:03

## 2019-01-27 RX ADMIN — FAMOTIDINE SCH MG: 20 TABLET ORAL at 08:05

## 2019-01-27 RX ADMIN — Medication SCH MG: at 20:20

## 2019-01-27 RX ADMIN — Medication SCH DROP: at 17:49

## 2019-01-27 RX ADMIN — Medication SCH DROP: at 20:24

## 2019-01-27 RX ADMIN — Medication SCH DROP: at 13:06

## 2019-01-27 RX ADMIN — SODIUM CHLORIDE SCH MLS/HR: 9 INJECTION, SOLUTION INTRAVENOUS at 00:00

## 2019-01-27 NOTE — NUR
PATIENT HAS BEEN SCREENED AND CATEGORIZED AS MODERATE NUTRITION RISK. PATIENT WILL BE SEEN 
WITHIN 3-5 DAYS OF ADMISSION.



01/27/19-01/28/19



CHELLY DUMONT MS, RDN

## 2019-01-27 NOTE — NUR
PT SEEN BY DR. FONSECA WITH NEW ORDERS. MD NOTIFIED THAT SCABIES SCRAPING WILL BE DONE 
TOMORROW, SCABIES SCRAPPING KIT WILL ARRIVE TOMORROW MORNING FROM [facility] AS PER BISHNU FROM 
THE LAB.

## 2019-01-27 NOTE — NUR
PT IN LOW BED WITH ALL FALLS PRECAUTIONS IN PLACE. PT RESTING WITH EYES OPEN BUT AROUSABLE 
TO NAME AND LIGHT TOUCH. V/S AS FOLLOWS T 97.0 P 77 R 18 B/P 121/70 02 99% ON R/A. NO S/S OF 
PAIN OR DISTRESS NOTED. IV SITE INTACT AND FLUIDS  OF NORMAL SALINE REPLACED AND RUNNING AT 
3MLS/HR TO KVO.

## 2019-01-27 NOTE — NUR
RECEIVED REPORT FROM MIRACLE YANES FOR CONTINUITY OF CARE. PT A/OX1, UNDERSTANDS Iranian BUT IS 
ONLY ABLE TO SAY "MARGO", ON ROOM AIR. PT IS UNABLE TO MAKE NEEDS KNOWN, BUT ABLE TO FOLLOW 
SIMPLE COMMANDS LIKE SWALLOWING HER FOOD/MEDICATIONS. PT IS ON BEDREST. PT HAS BODY RASH AND 
A BLACKENED PINKY FINGER TO LEFT HAND. PT HAS A 22G IV TO RIGHT HAND/WRIST, ASYMPTOMATIC AND 
INTACT. VITAL SIGNS WITHIN NORMAL LIMITS. PT STABLE, FLACC 0, NO SIGNS OF DISTRESS NOTED AT 
THIS TIME. PT POSITIONED FOR COMFORT. BED IN LOWEST POSITION, BED ALARM ON. WILL CONTINUE TO 
MONITOR.

## 2019-01-27 NOTE — NUR
RECEIVED PT AWAKE AND ALERT, ORIENTED TO NAME ONLY, CONFUSED, APHASIC. NO SOB NOTED. NO 
SIGNS OF PAIN AT THIS TIME. IV TO RT HAND PATENT AND INTACT. LT ARM AV SHUNT NOTED, BRUITS 
AND THRILL FELT. WITH LEFT 5TH FINGER GANGRENE NOTED,  DRY AND OPEN TO AIR. CHEST, 
DIMINISHED AIR ENTRY TO THE BASES. ABDOMEN SOFT, BOWEL SOUNDS PRESENT. PREVIOUS G-TUBE SITE 
DRY, OPEN TO AIR. RT SIDED WEAKNESS NOTED. WILL REPOSITION PT EVERY 2 HRS. BED ON LOWEST 
POSITION, WITH 3 SIDE RAILS RAISED UP, BED ALARM ON. INSTRUCTED PT TO CALL FOR 
ASSISTANCE,CALL LIGHT WITHIN REACH, PT VERBALIZED PARTIAL UNDERSTANDING BY NODDING HEAD.

## 2019-01-27 NOTE — NUR
PT IN BED WITH EYES CLOSED NO S/S OF PAIN OR DISTRESS NOTED. IV SITE INTACT AND RUNNING AT 
3MLS/HR. V/A T 97.1 P P74 R 18 B/P 141/82.

## 2019-01-28 VITALS — DIASTOLIC BLOOD PRESSURE: 80 MMHG | SYSTOLIC BLOOD PRESSURE: 157 MMHG

## 2019-01-28 VITALS — DIASTOLIC BLOOD PRESSURE: 77 MMHG | SYSTOLIC BLOOD PRESSURE: 145 MMHG

## 2019-01-28 VITALS — DIASTOLIC BLOOD PRESSURE: 89 MMHG | SYSTOLIC BLOOD PRESSURE: 157 MMHG

## 2019-01-28 VITALS — SYSTOLIC BLOOD PRESSURE: 137 MMHG | DIASTOLIC BLOOD PRESSURE: 76 MMHG

## 2019-01-28 VITALS — DIASTOLIC BLOOD PRESSURE: 79 MMHG | SYSTOLIC BLOOD PRESSURE: 133 MMHG

## 2019-01-28 VITALS — SYSTOLIC BLOOD PRESSURE: 157 MMHG | DIASTOLIC BLOOD PRESSURE: 80 MMHG

## 2019-01-28 LAB
ALBUMIN FLD-MCNC: 2.9 G/DL (ref 3.4–5)
ANION GAP SERPL CALCULATED.3IONS-SCNC: 11 MMOL/L (ref 8–16)
AST SERPL-CCNC: 21 U/L (ref 15–37)
BASOPHILS # BLD AUTO: 0.1 K/UL (ref 0–0.22)
BASOPHILS NFR BLD AUTO: 0.9 % (ref 0–2)
BILIRUB DIRECT SERPL-MCNC: 0.2 MG/DL (ref 0–0.3)
BILIRUB SERPL-MCNC: 0.5 MG/DL (ref 0–1)
BUN SERPL-MCNC: 31 MG/DL (ref 7–18)
CHLORIDE SERPL-SCNC: 104 MMOL/L (ref 98–107)
CO2 SERPL-SCNC: 28.5 MMOL/L (ref 21–32)
CREAT SERPL-MCNC: 3.3 MG/DL (ref 0.6–1.3)
EOSINOPHIL # BLD AUTO: 0.3 K/UL (ref 0–0.4)
EOSINOPHIL NFR BLD AUTO: 5 % (ref 0–4)
ERYTHROCYTE [DISTWIDTH] IN BLOOD BY AUTOMATED COUNT: 17.7 % (ref 11.6–13.7)
GFR SERPL CREATININE-BSD FRML MDRD: 19 ML/MIN (ref 90–?)
GLUCOSE SERPL-MCNC: 189 MG/DL (ref 74–106)
HCT VFR BLD AUTO: 34.5 % (ref 36–48)
HGB BLD-MCNC: 10.7 G/DL (ref 12–16)
LYMPHOCYTES # BLD AUTO: 0.5 K/UL (ref 2.5–16.5)
LYMPHOCYTES NFR BLD AUTO: 8.4 % (ref 20.5–51.1)
MAGNESIUM SERPL-MCNC: 1.9 MG/DL (ref 1.8–2.4)
MCH RBC QN AUTO: 26 PG (ref 27–31)
MCHC RBC AUTO-ENTMCNC: 31 G/DL (ref 33–37)
MCV RBC AUTO: 84.2 FL (ref 80–94)
MONOCYTES # BLD AUTO: 0.5 K/UL (ref 0.8–1)
MONOCYTES NFR BLD AUTO: 7.3 % (ref 1.7–9.3)
NEUTROPHILS # BLD AUTO: 5.1 K/UL (ref 1.8–7.7)
NEUTROPHILS NFR BLD AUTO: 78.4 % (ref 42.2–75.2)
PHOSPHATE SERPL-MCNC: 3.5 MG/DL (ref 2.5–4.9)
PLATELET # BLD AUTO: 196 K/UL (ref 140–450)
POTASSIUM SERPL-SCNC: 3.5 MMOL/L (ref 3.5–5.1)
RBC # BLD AUTO: 4.1 MIL/UL (ref 4.2–5.4)
SODIUM SERPL-SCNC: 140 MMOL/L (ref 136–145)
WBC # BLD AUTO: 6.5 K/UL (ref 4.8–10.8)

## 2019-01-28 PROCEDURE — 5A1D70Z PERFORMANCE OF URINARY FILTRATION, INTERMITTENT, LESS THAN 6 HOURS PER DAY: ICD-10-PCS

## 2019-01-28 RX ADMIN — MUPIROCIN SCH GM: 2 CREAM TOPICAL at 08:37

## 2019-01-28 RX ADMIN — Medication SCH MG: at 06:39

## 2019-01-28 RX ADMIN — SODIUM CHLORIDE SCH MLS/HR: 9 INJECTION, SOLUTION INTRAVENOUS at 00:00

## 2019-01-28 RX ADMIN — Medication SCH DEV: at 12:20

## 2019-01-28 RX ADMIN — Medication SCH MG: at 17:17

## 2019-01-28 RX ADMIN — SEVELAMER CARBONATE SCH MG: 800 TABLET, FILM COATED ORAL at 08:31

## 2019-01-28 RX ADMIN — INSULIN LISPRO PRN UNITS: 100 INJECTION, SOLUTION INTRAVENOUS; SUBCUTANEOUS at 20:39

## 2019-01-28 RX ADMIN — SEVELAMER CARBONATE SCH MG: 800 TABLET, FILM COATED ORAL at 17:17

## 2019-01-28 RX ADMIN — Medication SCH DROP: at 20:33

## 2019-01-28 RX ADMIN — INSULIN LISPRO PRN UNITS: 100 INJECTION, SOLUTION INTRAVENOUS; SUBCUTANEOUS at 12:30

## 2019-01-28 RX ADMIN — CHLORHEXIDINE GLUCONATE SCH EA: 2 SOLUTION TOPICAL at 17:18

## 2019-01-28 RX ADMIN — ATORVASTATIN CALCIUM SCH MG: 20 TABLET, FILM COATED ORAL at 20:31

## 2019-01-28 RX ADMIN — FAMOTIDINE SCH MG: 20 TABLET ORAL at 08:31

## 2019-01-28 RX ADMIN — Medication SCH DROP: at 12:36

## 2019-01-28 RX ADMIN — MIRTAZAPINE SCH MG: 15 TABLET, FILM COATED ORAL at 20:32

## 2019-01-28 RX ADMIN — Medication SCH MG: at 08:31

## 2019-01-28 RX ADMIN — SEVELAMER CARBONATE SCH MG: 800 TABLET, FILM COATED ORAL at 12:35

## 2019-01-28 RX ADMIN — Medication SCH DROP: at 17:17

## 2019-01-28 RX ADMIN — Medication SCH DEV: at 05:53

## 2019-01-28 RX ADMIN — INSULIN GLARGINE SCH UNITS: 100 INJECTION, SOLUTION SUBCUTANEOUS at 09:00

## 2019-01-28 RX ADMIN — Medication SCH DEV: at 20:26

## 2019-01-28 RX ADMIN — Medication SCH TAB: at 20:32

## 2019-01-28 RX ADMIN — Medication SCH DROP: at 08:32

## 2019-01-28 RX ADMIN — Medication SCH DEV: at 17:12

## 2019-01-28 RX ADMIN — MUPIROCIN SCH GM: 2 CREAM TOPICAL at 17:17

## 2019-01-28 RX ADMIN — Medication SCH MG: at 12:35

## 2019-01-28 NOTE — NUR
ADMINISTERED SCHEDULED MEDS. WITHHELD BP MEDS D/T HEMODIALYSIS TODAY. PATIENT TOLERATED 
WELL. WILL CONTINUE TO MONITOR.

## 2019-01-28 NOTE — NUR
RECEIVED BEDSIDE REPORT FROM DAY SHIFT RN. PT IS ON ROOM AIR. NO S/S OF DISTRESS. A&OX1. 
MAINLY Burundian SPEAKING. ABLE TO FOLLOW COMMAND. IS EXPRESSIVE APHASIC. HX CVA WITH RIGHT 
SIDED WEAKNESS. HAS IV ON R HAND 22G TKO. HAS BODY RASH. ON CONTACT ISOLATION FOR MRSA.LUE 
FISTULA. HAD HD TODAY 2000ML WAS REMOVED. SIGN ON DOOR FOR NO B/P ON LEFT SIDE. PT LEFT 5TH 
FINGER GANGRENE. IS INCONTINENT. ON FALL PROTOCOL. PLAN OF CARE WAS DISCUSSED. DAUGHTER IS 
AT BEDSIDE. CALL LIGHT WITHIN REACH. WILL CONTINUE TO MONITOR.

## 2019-01-28 NOTE — NUR
VITAL SIGNS ARE WITHIN NORMAL LIMITS. DUE MEDICATIONS ADMINISTERED. PT TOLERATED WELL. 
SAFETY MEASURES ARE IN PLACE. CALL LIGHT WITHIN REACH.

## 2019-01-28 NOTE — NUR
WOUND CARE EVALUATION NOTE:

REASON FOR EVALUATION: LEFT FIFTH DIGIT DISCOLORATION



COMPLETE SKIN ASSESSMENT DONE ON THIS 57 Y/O FEMALE PATIENT ADMITTED TO [facility] FOR WORSENING 
FIFTH DIGIT PAIN AND DISCOLORATION. PAST MEDICAL HX INCLUDE CVA, DM II, HTN, COPD, AND ESRD. 
ALL ABOVE INFORMATION WAS OBTAINED FROM THE ADMISSION H&P. PATIENT IS CURRENTLY HAVING HD. 
LEFT SHUNT NOTED. SKIN WARM AND DRY. NO HAIR GROWTH TO BLE, BILATERAL DORSAL PEDAL PULSES 
PRESENT. 



INTEGUMENTARY

-BLE DRY AND INTACT

-MULTIPLE SKIN LESIONS/SCABS NOTED TO THE LEFT UPPER ARM, LEFT UPPER CHEST AND BACK. SKIN 
SCRAPING FOR SCABIES PENDING

-HEALING STOMA FROM AN OLD-G-TUBE SITE NOTED

-DRY, INTACT ESCHAR TO THE LEFT FIFTH DIGIT 



RECOMMENDATIONS

-APPLY BETADINE TO LEFT FIFTH DIGIT AND COVER WITH CLEAN DRESSING

-KEEP SKIN CLEAN AND DRY AT ALL TIMES

-F/U WITH SKIN SCRAPING FOR SCABIES RESULTS

-APPLY MOISTURIZER TO DRY BLE SKIN

-TURN AND REPOSITION PATIENT Q2H, ASSESS SKIN

-OFFLOAD BILATERAL HEELS BY PLACING PILLOWS UNDER CALVES AT ALL TIMES, UNLESS OTHERWISE 
CONTRAINDICATED

-PRESSURE REDISTRIBUTION SURFACE THERAPY. 



RECOMMENDATIONS DISCUSSED WITH PRIMARY RN.

PLEASE CONTACT WOUND CARE NURSE FOR ANY QUESTIONS AND CHANGES IN SKIN CONDITION

## 2019-01-28 NOTE — NUR
RECEIVED BEDSIDE REPORT FROM NIGHT SHIFT NURSE. PATIENT CONFUSED. PATIENT ON ROOM AIR, NO 
DISTRESS NOTED. PATIENT ON TELE MONITOR. RASHES ON SKIN AND L PINKY DISCOLORATION. PATIENT 
UNABLE TO AMBULATE. PATIENT ON CONTACT ISO FOR MRSA. SHERMAN FISTULA, NO BP/VENIPUNCTURE SIGN 
POSTED AT [facility]. IV ON  R HAND 22 G TKO, IV CLEAN DRY AND INTACT. FALL RISK PROTOCOL IN PLACE. 
BED IN LOW POSITION, CALL LIGHT WITHIN REACH. WILL CONTINUE TO MONITOR.

## 2019-01-28 NOTE — NUR
1/28/19 RD INITIAL ASSESSMENT COMPLETED



PLEASE REFER TO NUTRITION ASSESSMENT UNDER CARE ACTIVITY FOR ESTIMATED NUTRITIONAL NEEDS. 



1. CONTINUE CARDIAC 60 GM CCHO, 2 GM K, LOW PHOSPHORUS DIET AS TOLERATED  

2. RD TO FOLLOW UP ON PO INTAKE. IF AVERAGE INTAKE <75%, RECOMMEND NEPRO BID. 

3. RD TO PROVIDE RENAL DIET EDUCATION UPON FOLLOW UP 

4. RD TO FOLLOW-UP 3-5 DAYS, MODERATE RISK 



KEVIN NIEVES, RD

## 2019-01-28 NOTE — NUR
VITAL SIGNS WITHIN NORMAL LIMITS. PT STABLE, FLACC 0, NO SIGNS OF DISTRESS NOTED AT THIS 
TIME. PT POSITIONED FOR COMFORT. BED IN LOWEST POSITION, BED ALARM ON. WILL CONTINUE TO 
MONITOR.

## 2019-01-29 VITALS — DIASTOLIC BLOOD PRESSURE: 85 MMHG | SYSTOLIC BLOOD PRESSURE: 140 MMHG

## 2019-01-29 VITALS — DIASTOLIC BLOOD PRESSURE: 77 MMHG | SYSTOLIC BLOOD PRESSURE: 137 MMHG

## 2019-01-29 VITALS — DIASTOLIC BLOOD PRESSURE: 70 MMHG | SYSTOLIC BLOOD PRESSURE: 135 MMHG

## 2019-01-29 VITALS — SYSTOLIC BLOOD PRESSURE: 101 MMHG | DIASTOLIC BLOOD PRESSURE: 62 MMHG

## 2019-01-29 VITALS — DIASTOLIC BLOOD PRESSURE: 85 MMHG | SYSTOLIC BLOOD PRESSURE: 156 MMHG

## 2019-01-29 LAB
ANION GAP SERPL CALCULATED.3IONS-SCNC: 12.1 MMOL/L (ref 8–16)
BASOPHILS # BLD AUTO: 0.1 K/UL (ref 0–0.22)
BASOPHILS NFR BLD AUTO: 2.2 % (ref 0–2)
BUN SERPL-MCNC: 43 MG/DL (ref 7–18)
CHLORIDE SERPL-SCNC: 105 MMOL/L (ref 98–107)
CO2 SERPL-SCNC: 27.9 MMOL/L (ref 21–32)
CREAT SERPL-MCNC: 4.9 MG/DL (ref 0.6–1.3)
EOSINOPHIL # BLD AUTO: 0.4 K/UL (ref 0–0.4)
EOSINOPHIL NFR BLD AUTO: 7.1 % (ref 0–4)
ERYTHROCYTE [DISTWIDTH] IN BLOOD BY AUTOMATED COUNT: 17.3 % (ref 11.6–13.7)
GFR SERPL CREATININE-BSD FRML MDRD: 12 ML/MIN (ref 90–?)
GLUCOSE SERPL-MCNC: 108 MG/DL (ref 74–106)
HCT VFR BLD AUTO: 36.9 % (ref 36–48)
HGB BLD-MCNC: 11.2 G/DL (ref 12–16)
LYMPHOCYTES # BLD AUTO: 1 K/UL (ref 2.5–16.5)
LYMPHOCYTES NFR BLD AUTO: 16.5 % (ref 20.5–51.1)
MAGNESIUM SERPL-MCNC: 2.2 MG/DL (ref 1.8–2.4)
MCH RBC QN AUTO: 26 PG (ref 27–31)
MCHC RBC AUTO-ENTMCNC: 30 G/DL (ref 33–37)
MCV RBC AUTO: 84.5 FL (ref 80–94)
MONOCYTES # BLD AUTO: 0.5 K/UL (ref 0.8–1)
MONOCYTES NFR BLD AUTO: 8.9 % (ref 1.7–9.3)
NEUTROPHILS # BLD AUTO: 3.9 K/UL (ref 1.8–7.7)
NEUTROPHILS NFR BLD AUTO: 65.3 % (ref 42.2–75.2)
PHOSPHATE SERPL-MCNC: 5.7 MG/DL (ref 2.5–4.9)
PLATELET # BLD AUTO: 202 K/UL (ref 140–450)
POTASSIUM SERPL-SCNC: 5 MMOL/L (ref 3.5–5.1)
RBC # BLD AUTO: 4.36 MIL/UL (ref 4.2–5.4)
SODIUM SERPL-SCNC: 140 MMOL/L (ref 136–145)
WBC # BLD AUTO: 6 K/UL (ref 4.8–10.8)

## 2019-01-29 RX ADMIN — Medication SCH MG: at 06:34

## 2019-01-29 RX ADMIN — Medication SCH MG: at 08:49

## 2019-01-29 RX ADMIN — SEVELAMER CARBONATE SCH MG: 800 TABLET, FILM COATED ORAL at 16:57

## 2019-01-29 RX ADMIN — FAMOTIDINE SCH MG: 20 TABLET ORAL at 08:48

## 2019-01-29 RX ADMIN — Medication SCH MG: at 16:58

## 2019-01-29 RX ADMIN — Medication SCH DROP: at 13:08

## 2019-01-29 RX ADMIN — Medication SCH DROP: at 08:48

## 2019-01-29 RX ADMIN — Medication SCH DEV: at 05:47

## 2019-01-29 RX ADMIN — SEVELAMER CARBONATE SCH MG: 800 TABLET, FILM COATED ORAL at 12:57

## 2019-01-29 RX ADMIN — Medication SCH DEV: at 16:30

## 2019-01-29 RX ADMIN — Medication SCH MG: at 13:08

## 2019-01-29 RX ADMIN — SEVELAMER CARBONATE SCH MG: 800 TABLET, FILM COATED ORAL at 08:50

## 2019-01-29 RX ADMIN — INSULIN GLARGINE SCH UNITS: 100 INJECTION, SOLUTION SUBCUTANEOUS at 10:45

## 2019-01-29 RX ADMIN — Medication SCH DEV: at 11:30

## 2019-01-29 RX ADMIN — INSULIN LISPRO PRN UNITS: 100 INJECTION, SOLUTION INTRAVENOUS; SUBCUTANEOUS at 13:07

## 2019-01-29 RX ADMIN — SODIUM CHLORIDE SCH MLS/HR: 9 INJECTION, SOLUTION INTRAVENOUS at 00:00

## 2019-01-29 RX ADMIN — Medication SCH MG: at 08:48

## 2019-01-29 NOTE — NUR
GAVE DISCHARGE INSTRUCTIONS AND PRESCRIPTION INFORMATION TO PT AND DAUGHTER (ADAM). 
DAUGHTER VERBALIZED UNDERSTANDING OF INSTRUCTIONS. PT IS APHASIC. ALL QUESTIONS ANSWERED AT 
THIS TIME. GAVE REPORT TO TRANSPORT TEAM FOR CONTINUITY OF CARE DURING TRANSPORT. ALL 
QUESTIONS ANSWERED AT THIS TIME. IV SALINE LOCKED. ID BAND REMOVED. PT TRANSFERRED TO 
[facility]. PT IN STABLE CONDITION.

## 2019-01-29 NOTE — NUR
PT SLEEPING COMFORTABLY IN BED. EASILY AROUSABLE. VITAL SIGNS ARE WITHIN NORMAL LIMITS. ALL 
NEEDS MET AT THIS TIME. CALL LIGHT WITHIN REACH.

## 2019-01-29 NOTE — NUR
GAVE REPORT TO BRET HOWELL STAFF FROM [facility] [phone] FOR CONTINUITY OF CARE. 
ALL QUESTIONS ANSWERED AT THIS TIME. BRET VERBALIZED UNDERSTANDING OF INSTRUCTIONS.

## 2019-01-29 NOTE — NUR
PT LYING IN BED SLEEPING. RESPIRATIONS EVEN AND UNLABORED. BED ALARM ON. BED IN LOW 
POSITION. CALL LIGHT AT BEDSIDE. WILL CONTINUE TO MONITOR.

## 2019-01-29 NOTE — NUR
CM NOTE



FAXED ORDER TO DISCHARGE TO [facility] TODAY AND CLINICAL PACKET INCLUDING MICRO 
RESULTS TO [facility] [phone].



PER ADAM OF [facility] PH# [phone], PATIENT CAN GO TO [facility] UNDER DR. STATON AFTER 
1700 TIME TODAY.



PATIENT AND PATIENT'S DAUGHTER UNABLE TO PAY FOR TRANSPORTATION.  PER BHARATH OF [facility] 
TRANSPORT PH# [phone], PATIENT WILL BE PICKED UP AT 1700 TIME TODAY GOING TO [facility].



CHARGE NURSE FILOMENA RAUSCH AWARE.

## 2019-01-29 NOTE — NUR
GAVE ORDERED DUE MEDICATIONS WILL CONTINUE TO MONITOR. BED ALARM ON. BED IN LOW POSITION. 
CALL LIGHT AT BEDSIDE. WILL CONTINUE TO MONITOR.

## 2019-01-29 NOTE — NUR
RECEIVED REPORT FROM NIGHT SHIFT NURSE. PT IN STABLE CONDITION. RESPIRATIONS EVEN AND 
UNLABORED. IV INTACT AND PATENT. SAFETY MEASURES IN PLACE. BED ALARM SET. BED IN LOW 
POSITION. CALL LIGHT AT BEDSIDE. WILL CONTINUE TO MONITOR.

## 2019-01-29 NOTE — NUR
PT SITTING UP IN BED EATING NO DISTRESS NOTED. RESPIRATIONS EVEN AND UNLABORED. BED ALARM 
ON. BED IN LOW POSITION. CALL LIGHT AT BEDSIDE. WILL CONTINUE TO MONITOR.

## 2019-03-13 ENCOUNTER — HOSPITAL ENCOUNTER (INPATIENT)
Dept: HOSPITAL 26 - MED | Age: 59
LOS: 5 days | Discharge: NURSING, REHAB OR LONG-TERM CARE | DRG: 280 | End: 2019-03-18
Attending: FAMILY MEDICINE | Admitting: FAMILY MEDICINE
Payer: COMMERCIAL

## 2019-03-13 VITALS — SYSTOLIC BLOOD PRESSURE: 128 MMHG | DIASTOLIC BLOOD PRESSURE: 78 MMHG

## 2019-03-13 VITALS — SYSTOLIC BLOOD PRESSURE: 150 MMHG | DIASTOLIC BLOOD PRESSURE: 85 MMHG

## 2019-03-13 VITALS
SYSTOLIC BLOOD PRESSURE: 154 MMHG | BODY MASS INDEX: 26.8 KG/M2 | WEIGHT: 157 LBS | DIASTOLIC BLOOD PRESSURE: 59 MMHG | HEIGHT: 64 IN

## 2019-03-13 VITALS — DIASTOLIC BLOOD PRESSURE: 68 MMHG | SYSTOLIC BLOOD PRESSURE: 110 MMHG

## 2019-03-13 VITALS — SYSTOLIC BLOOD PRESSURE: 112 MMHG | DIASTOLIC BLOOD PRESSURE: 92 MMHG

## 2019-03-13 VITALS — SYSTOLIC BLOOD PRESSURE: 149 MMHG | DIASTOLIC BLOOD PRESSURE: 80 MMHG

## 2019-03-13 VITALS — SYSTOLIC BLOOD PRESSURE: 119 MMHG | DIASTOLIC BLOOD PRESSURE: 64 MMHG

## 2019-03-13 VITALS — SYSTOLIC BLOOD PRESSURE: 128 MMHG | DIASTOLIC BLOOD PRESSURE: 91 MMHG

## 2019-03-13 DIAGNOSIS — I35.0: ICD-10-CM

## 2019-03-13 DIAGNOSIS — E11.22: ICD-10-CM

## 2019-03-13 DIAGNOSIS — I25.10: ICD-10-CM

## 2019-03-13 DIAGNOSIS — E78.5: ICD-10-CM

## 2019-03-13 DIAGNOSIS — E11.65: ICD-10-CM

## 2019-03-13 DIAGNOSIS — F32.9: ICD-10-CM

## 2019-03-13 DIAGNOSIS — I13.2: ICD-10-CM

## 2019-03-13 DIAGNOSIS — G93.41: ICD-10-CM

## 2019-03-13 DIAGNOSIS — I34.0: ICD-10-CM

## 2019-03-13 DIAGNOSIS — T82.898A: Primary | ICD-10-CM

## 2019-03-13 DIAGNOSIS — I21.A1: ICD-10-CM

## 2019-03-13 DIAGNOSIS — J44.0: ICD-10-CM

## 2019-03-13 DIAGNOSIS — Z83.3: ICD-10-CM

## 2019-03-13 DIAGNOSIS — N18.6: ICD-10-CM

## 2019-03-13 DIAGNOSIS — E87.1: ICD-10-CM

## 2019-03-13 DIAGNOSIS — I69.391: ICD-10-CM

## 2019-03-13 DIAGNOSIS — J20.9: ICD-10-CM

## 2019-03-13 DIAGNOSIS — M62.50: ICD-10-CM

## 2019-03-13 DIAGNOSIS — I69.351: ICD-10-CM

## 2019-03-13 DIAGNOSIS — G40.909: ICD-10-CM

## 2019-03-13 DIAGNOSIS — F41.9: ICD-10-CM

## 2019-03-13 DIAGNOSIS — Z79.899: ICD-10-CM

## 2019-03-13 DIAGNOSIS — I27.21: ICD-10-CM

## 2019-03-13 DIAGNOSIS — I27.20: ICD-10-CM

## 2019-03-13 DIAGNOSIS — E11.319: ICD-10-CM

## 2019-03-13 DIAGNOSIS — E11.52: ICD-10-CM

## 2019-03-13 DIAGNOSIS — N17.0: ICD-10-CM

## 2019-03-13 DIAGNOSIS — R65.21: ICD-10-CM

## 2019-03-13 DIAGNOSIS — I69.320: ICD-10-CM

## 2019-03-13 DIAGNOSIS — N39.0: ICD-10-CM

## 2019-03-13 DIAGNOSIS — J18.9: ICD-10-CM

## 2019-03-13 DIAGNOSIS — Z88.5: ICD-10-CM

## 2019-03-13 DIAGNOSIS — R13.10: ICD-10-CM

## 2019-03-13 DIAGNOSIS — A41.9: ICD-10-CM

## 2019-03-13 DIAGNOSIS — I96: ICD-10-CM

## 2019-03-13 DIAGNOSIS — Z79.4: ICD-10-CM

## 2019-03-13 DIAGNOSIS — E87.5: ICD-10-CM

## 2019-03-13 DIAGNOSIS — J96.00: ICD-10-CM

## 2019-03-13 DIAGNOSIS — Z99.2: ICD-10-CM

## 2019-03-13 DIAGNOSIS — E44.0: ICD-10-CM
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
## 2019-03-13 DIAGNOSIS — E78.00: ICD-10-CM

## 2019-03-13 DIAGNOSIS — Z90.710: ICD-10-CM

## 2019-03-13 DIAGNOSIS — L97.519: ICD-10-CM

## 2019-03-13 DIAGNOSIS — E11.621: ICD-10-CM

## 2019-03-13 DIAGNOSIS — I50.43: ICD-10-CM

## 2019-03-13 DIAGNOSIS — K21.9: ICD-10-CM

## 2019-03-13 DIAGNOSIS — Z90.49: ICD-10-CM
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
## 2019-03-13 LAB
ALBUMIN FLD-MCNC: 2.9 G/DL (ref 3.4–5)
ANION GAP SERPL CALCULATED.3IONS-SCNC: 18.9 MMOL/L (ref 8–16)
APPEARANCE UR: (no result)
AST SERPL-CCNC: 68 U/L (ref 15–37)
BASOPHILS # BLD AUTO: 0 K/UL (ref 0–0.22)
BASOPHILS NFR BLD AUTO: 0.4 % (ref 0–2)
BILIRUB SERPL-MCNC: 0.6 MG/DL (ref 0–1)
BILIRUB UR QL STRIP: NEGATIVE
BUN SERPL-MCNC: 81 MG/DL (ref 7–18)
CHLORIDE SERPL-SCNC: 92 MMOL/L (ref 98–107)
CO2 SERPL-SCNC: 28.3 MMOL/L (ref 21–32)
COLOR UR: YELLOW
CREAT SERPL-MCNC: 7.6 MG/DL (ref 0.6–1.3)
EOSINOPHIL # BLD AUTO: 0 K/UL (ref 0–0.4)
EOSINOPHIL NFR BLD AUTO: 0 % (ref 0–4)
ERYTHROCYTE [DISTWIDTH] IN BLOOD BY AUTOMATED COUNT: 16.4 % (ref 11.6–13.7)
GFR SERPL CREATININE-BSD FRML MDRD: 7 ML/MIN (ref 90–?)
GLUCOSE SERPL-MCNC: 350 MG/DL (ref 74–106)
GLUCOSE UR STRIP-MCNC: (no result) MG/DL
HCT VFR BLD AUTO: 37.1 % (ref 36–48)
HGB BLD-MCNC: 11.6 G/DL (ref 12–16)
HGB UR QL STRIP: (no result)
LEUKOCYTE ESTERASE UR QL STRIP: (no result)
LIPASE SERPL-CCNC: 138 U/L (ref 73–393)
LYMPHOCYTES # BLD AUTO: 0.7 K/UL (ref 2.5–16.5)
LYMPHOCYTES NFR BLD AUTO: 6.4 % (ref 20.5–51.1)
MAGNESIUM SERPL-MCNC: 2.7 MG/DL (ref 1.8–2.4)
MCH RBC QN AUTO: 27 PG (ref 27–31)
MCHC RBC AUTO-ENTMCNC: 31 G/DL (ref 33–37)
MCV RBC AUTO: 86.5 FL (ref 80–94)
MONOCYTES # BLD AUTO: 1.1 K/UL (ref 0.8–1)
MONOCYTES NFR BLD AUTO: 10.4 % (ref 1.7–9.3)
NEUTROPHILS # BLD AUTO: 8.4 K/UL (ref 1.8–7.7)
NEUTROPHILS NFR BLD AUTO: 82.8 % (ref 42.2–75.2)
NITRITE UR QL STRIP: POSITIVE
PH UR STRIP: 7.5 [PH] (ref 5–9)
PHOSPHATE SERPL-MCNC: 8.6 MG/DL (ref 2.5–4.9)
PLATELET # BLD AUTO: 175 K/UL (ref 140–450)
POTASSIUM SERPL-SCNC: 5.2 MMOL/L (ref 3.5–5.1)
PROTHROMBIN TIME: 11.2 SECS (ref 10.8–13.4)
RBC # BLD AUTO: 4.29 MIL/UL (ref 4.2–5.4)
RBC #/AREA URNS HPF: (no result) /HPF (ref 0–5)
SODIUM SERPL-SCNC: 134 MMOL/L (ref 136–145)
TSH SERPL DL<=0.05 MIU/L-ACNC: 2.13 UIU/ML (ref 0.34–3.74)
WBC # BLD AUTO: 10.2 K/UL (ref 4.8–10.8)
WBC,URINE: (no result) /HPF (ref 0–5)

## 2019-03-13 PROCEDURE — 5A1D70Z PERFORMANCE OF URINARY FILTRATION, INTERMITTENT, LESS THAN 6 HOURS PER DAY: ICD-10-PCS

## 2019-03-13 PROCEDURE — P9046 ALBUMIN (HUMAN), 25%, 20 ML: HCPCS

## 2019-03-13 RX ADMIN — INSULIN LISPRO PRN UNITS: 100 INJECTION, SOLUTION INTRAVENOUS; SUBCUTANEOUS at 21:16

## 2019-03-13 RX ADMIN — IPRATROPIUM BROMIDE AND ALBUTEROL SULFATE SCH ML: .5; 3 SOLUTION RESPIRATORY (INHALATION) at 19:00

## 2019-03-13 RX ADMIN — SEVELAMER CARBONATE SCH MG: 800 TABLET, FILM COATED ORAL at 14:02

## 2019-03-13 RX ADMIN — Medication SCH TAB: at 21:00

## 2019-03-13 RX ADMIN — Medication SCH DEV: at 17:04

## 2019-03-13 RX ADMIN — SEVELAMER CARBONATE SCH MG: 800 TABLET, FILM COATED ORAL at 17:00

## 2019-03-13 RX ADMIN — Medication SCH DEV: at 20:56

## 2019-03-13 RX ADMIN — SODIUM CHLORIDE SCH MLS/HR: 9 INJECTION, SOLUTION INTRAVENOUS at 12:30

## 2019-03-13 RX ADMIN — Medication SCH MG: at 21:00

## 2019-03-13 RX ADMIN — PIPERACILLIN SODIUM AND TAZOBACTAM SODIUM SCH MLS/HR: 2; .25 INJECTION, SOLUTION INTRAVENOUS at 20:59

## 2019-03-13 RX ADMIN — IPRATROPIUM BROMIDE AND ALBUTEROL SULFATE SCH ML: .5; 3 SOLUTION RESPIRATORY (INHALATION) at 13:05

## 2019-03-13 RX ADMIN — ATORVASTATIN CALCIUM SCH MG: 20 TABLET, FILM COATED ORAL at 21:00

## 2019-03-13 RX ADMIN — MIRTAZAPINE SCH MG: 15 TABLET, FILM COATED ORAL at 21:01

## 2019-03-13 NOTE — NUR
NOTIFIED DR GONZALEZ ABOUT ST DEPRESSION ON EKG, PT ASYMPTOMATIC @ THIS TIME. PER DR. GONZALEZ PT ON 
HEPARIN SQ AND CARDIOLOGIST, DR. RODGERS AWARE NO NEW ORDERS @ THIS TIME. WILL CONTINUE TO 
MONITOR.

## 2019-03-13 NOTE — NUR
Patient will be admitted to care of Dr. Dong. Admited to ICU-8. Belongings list 
completed.  VSS, transfered by MIRACLE Beckham and MIRACLE Phillips. Report to MIRACLE Bianchi.

## 2019-03-13 NOTE — NUR
PT BIBA FROM [facility] C/O FEVER AND LOW O2 SATURATION. PT PRESENTS TO 
THE ED ON 2L NON-REBREATHER SATURATION AT 96%, PT BASELINE NON-VERBAL. 

SKIN COOL, CLAMMY, LOOSE AND INTACT. B/S 352. RECTAL TEMP 103.4. LUNG SOUNDS: 
CRACKLES BL. PT IS ALERT TO NAME AND PAIN.

--PT HAS A LEFT UPPER ARM FISTULA FOR DIALYSIS, DIALYSIS SCHEDULE IS M,W,F.

--SIEZURE PRECAUTIONS IN PLACE, PT IN GOWN, IN BED; BED IN LOWER LOCKED 
POSITION. ER MD MADE AWARE OF PT STATUS. WILL CONTINUE TO MONITOR.



PMH: DM, COPD, CVA, SIEZURES, DIALYSIS

RX: SEE LIST

## 2019-03-13 NOTE — NUR
# 16 FR Cornell catheter using utilizing sterile technique.  Immediate return of 
200 ml tubid urine noted.  Bedside drainage bag placed below level of bladder.  
Urine sample collected and sent to lab.  Pt tolerated procedure well.

## 2019-03-13 NOTE — NUR
IV START: 18G RIGHT AC, FLUSHED WELL W/O RESISTANCE, NO REDNESS OR SWELLING 
NOTED. PT TOLERATED WELL.

## 2019-03-13 NOTE — NUR
RECEIVED PATIENT FROM ER RN, EVERARDO, FOR CONTINUITY OF CARE. PATIENT IS AAOX1, NONVERBAL AND 
FOLLOW SIMPLE COMMANDS. PATIENT SKIN IS WARM, DRY, INTACT. HAS SCRATCHES TO HER SHOULDER AND 
LEFT PINKY HAS DISCOLORATION. PATIENT HAS IV SITE TO RAC, 18 GAUGE, ASYMPTOMATIC AND PATENT. 
NASAL CANNULA AT 4LPM. VITALS STABLE, FLACC 0. PEREZ CATHETER IN PLACE TO CLOUDY YELLOW 
URINE. SAFETY PRECAUTIONS ASSESSED AND ENFORCED. NO SIGNS OF DISTRESS NOTED. WILL CONTINUE 
TO MONITOR

## 2019-03-13 NOTE — NUR
DR. BOGGS IS HERE FOR CENTRAL LINE, HOWEVER PATIENT IS REFUSING THE INSERTION OF CENTRAL 
LINE. DAUGHTER WAS CALLED AND IS AWARE, STATES ONLY TO INSERT CENTRAL LINE IF IT IS 
EMERGENT.

## 2019-03-13 NOTE — NUR
FAMILY AT BEDSIDE AT THIS TIME. ACHS BLOOD SUGAR 185. 2 UNITS INSULIN ADMINISTERED VIA 
SLIDING SCALE TO L JEN.

## 2019-03-13 NOTE — NUR
DR. FONSECA AT BEDSIDE AT THIS TIME. UPDATED ON PTS CURRENT CONDITION. WILL CONTINUE TO 
FOLLOW UP ANY ADDITIONAL ORDERS.

## 2019-03-13 NOTE — NUR
RECEIVED REPORT FROM AM SHIFT. PT AOX1. RESPONDS TO SIMPLE COMMANDS WITH HEAD NODS. 
NONVERBAL. TRACKING EYES. AFEBRILE. LUNG SOUNDS COARSE BILAT. NC 4LPM. EVEN UNLABORED 
BREATHING. SR ON MONITOR. S1S2 PRESENT. ON RENAL DIET. ABD SOFT NONTENDER. BOWEL SOUNDS 
ACTIVE X 4 QUADRANTS.  FC IN PLACE. URINE FRANK COLOR. L ARM AV SHUNT (+) BRUIT (+) THRILL. 
DIALYSIS MWF. DIALYSIS NURSE AT BEDSIDE AT THIS TIME. R AC IV SITE 20G. DRESSING INTACT. 
RIGHT SIDED WEAKNESS. SCABBING TO ANTERIOR SHOULDERS. L PINKY NECTROTIC. ON CONTACT 
PRECAUTIONS FOR HX MRSA NARES E. COLI URINE. BED IN LOWEST POSITION. SR UP X4. CALL LIGHT 
WITHIN REACH. WILL CONTINUE TO MONITOR.

## 2019-03-13 NOTE — NUR
PT DAUGHTER AT BEDSIDE. DAUGHTER WOULD LIKE TO BE CONTACTED W/ ANY CHANGE IN 
STATUS PHONE # [phone].

## 2019-03-13 NOTE — NUR
PATIENT HAVING DIALYSIS AND UNABLE TO GO AND REACH RESPIRATORY EQUIPMENT, NO SIGNS OF 
RESPIRATORY DISTRESS, B/S- CLEAR, HR-79, SAO2-99%. RR-20, B/P-116/73

## 2019-03-13 NOTE — NUR
HEMODIALYSIS STILL GOING. ENDORSED CONTINUITY OF CARE TO NIGHT SHIFT RNMARK. NO SIGNS OF 
DISTRESS AT THIS TIME

## 2019-03-14 VITALS — DIASTOLIC BLOOD PRESSURE: 50 MMHG | SYSTOLIC BLOOD PRESSURE: 106 MMHG

## 2019-03-14 VITALS — DIASTOLIC BLOOD PRESSURE: 69 MMHG | SYSTOLIC BLOOD PRESSURE: 103 MMHG

## 2019-03-14 VITALS — SYSTOLIC BLOOD PRESSURE: 127 MMHG | DIASTOLIC BLOOD PRESSURE: 80 MMHG

## 2019-03-14 VITALS — DIASTOLIC BLOOD PRESSURE: 74 MMHG | SYSTOLIC BLOOD PRESSURE: 133 MMHG

## 2019-03-14 VITALS — SYSTOLIC BLOOD PRESSURE: 120 MMHG | DIASTOLIC BLOOD PRESSURE: 68 MMHG

## 2019-03-14 VITALS — DIASTOLIC BLOOD PRESSURE: 81 MMHG | SYSTOLIC BLOOD PRESSURE: 120 MMHG

## 2019-03-14 VITALS — SYSTOLIC BLOOD PRESSURE: 120 MMHG | DIASTOLIC BLOOD PRESSURE: 64 MMHG

## 2019-03-14 VITALS — SYSTOLIC BLOOD PRESSURE: 115 MMHG | DIASTOLIC BLOOD PRESSURE: 76 MMHG

## 2019-03-14 VITALS — SYSTOLIC BLOOD PRESSURE: 110 MMHG | DIASTOLIC BLOOD PRESSURE: 58 MMHG

## 2019-03-14 VITALS — DIASTOLIC BLOOD PRESSURE: 72 MMHG | SYSTOLIC BLOOD PRESSURE: 99 MMHG

## 2019-03-14 VITALS — SYSTOLIC BLOOD PRESSURE: 112 MMHG | DIASTOLIC BLOOD PRESSURE: 63 MMHG

## 2019-03-14 VITALS — DIASTOLIC BLOOD PRESSURE: 64 MMHG | SYSTOLIC BLOOD PRESSURE: 124 MMHG

## 2019-03-14 LAB
ANION GAP SERPL CALCULATED.3IONS-SCNC: 14.3 MMOL/L (ref 8–16)
BASOPHILS # BLD AUTO: 0 K/UL (ref 0–0.22)
BASOPHILS NFR BLD AUTO: 0.5 % (ref 0–2)
BUN SERPL-MCNC: 48 MG/DL (ref 7–18)
CHLORIDE SERPL-SCNC: 95 MMOL/L (ref 98–107)
CHOLEST/HDLC SERPL: 2.1 {RATIO} (ref 1–4.5)
CO2 SERPL-SCNC: 33 MMOL/L (ref 21–32)
CREAT SERPL-MCNC: 5.4 MG/DL (ref 0.6–1.3)
EOSINOPHIL # BLD AUTO: 0 K/UL (ref 0–0.4)
EOSINOPHIL NFR BLD AUTO: 0.4 % (ref 0–4)
ERYTHROCYTE [DISTWIDTH] IN BLOOD BY AUTOMATED COUNT: 16.6 % (ref 11.6–13.7)
GFR SERPL CREATININE-BSD FRML MDRD: 10 ML/MIN (ref 90–?)
GLUCOSE SERPL-MCNC: 190 MG/DL (ref 74–106)
HCT VFR BLD AUTO: 35 % (ref 36–48)
HDLC SERPL-MCNC: 35 MG/DL (ref 40–60)
HGB BLD-MCNC: 11 G/DL (ref 12–16)
LDLC SERPL CALC-MCNC: 24 MG/DL (ref 60–100)
LYMPHOCYTES # BLD AUTO: 1 K/UL (ref 2.5–16.5)
LYMPHOCYTES NFR BLD AUTO: 10.4 % (ref 20.5–51.1)
MCH RBC QN AUTO: 27 PG (ref 27–31)
MCHC RBC AUTO-ENTMCNC: 31 G/DL (ref 33–37)
MCV RBC AUTO: 86.6 FL (ref 80–94)
MONOCYTES # BLD AUTO: 1 K/UL (ref 0.8–1)
MONOCYTES NFR BLD AUTO: 10.2 % (ref 1.7–9.3)
NEUTROPHILS # BLD AUTO: 7.5 K/UL (ref 1.8–7.7)
NEUTROPHILS NFR BLD AUTO: 78.5 % (ref 42.2–75.2)
PLATELET # BLD AUTO: 148 K/UL (ref 140–450)
POTASSIUM SERPL-SCNC: 4.3 MMOL/L (ref 3.5–5.1)
RBC # BLD AUTO: 4.05 MIL/UL (ref 4.2–5.4)
SODIUM SERPL-SCNC: 138 MMOL/L (ref 136–145)
TRIGL SERPL-MCNC: 68 MG/DL (ref 30–150)
WBC # BLD AUTO: 9.5 K/UL (ref 4.8–10.8)

## 2019-03-14 RX ADMIN — SEVELAMER CARBONATE SCH MG: 800 TABLET, FILM COATED ORAL at 16:05

## 2019-03-14 RX ADMIN — INSULIN LISPRO PRN UNITS: 100 INJECTION, SOLUTION INTRAVENOUS; SUBCUTANEOUS at 12:02

## 2019-03-14 RX ADMIN — IPRATROPIUM BROMIDE AND ALBUTEROL SULFATE SCH ML: .5; 3 SOLUTION RESPIRATORY (INHALATION) at 07:21

## 2019-03-14 RX ADMIN — INSULIN LISPRO PRN UNITS: 100 INJECTION, SOLUTION INTRAVENOUS; SUBCUTANEOUS at 21:53

## 2019-03-14 RX ADMIN — Medication SCH MG: at 08:23

## 2019-03-14 RX ADMIN — INSULIN GLARGINE SCH UNITS: 100 INJECTION, SOLUTION SUBCUTANEOUS at 08:31

## 2019-03-14 RX ADMIN — ATORVASTATIN CALCIUM SCH MG: 20 TABLET, FILM COATED ORAL at 20:52

## 2019-03-14 RX ADMIN — INSULIN LISPRO PRN UNITS: 100 INJECTION, SOLUTION INTRAVENOUS; SUBCUTANEOUS at 16:19

## 2019-03-14 RX ADMIN — FAMOTIDINE SCH MG: 20 TABLET ORAL at 08:23

## 2019-03-14 RX ADMIN — IPRATROPIUM BROMIDE AND ALBUTEROL SULFATE SCH ML: .5; 3 SOLUTION RESPIRATORY (INHALATION) at 19:10

## 2019-03-14 RX ADMIN — Medication SCH TAB: at 20:52

## 2019-03-14 RX ADMIN — MIRTAZAPINE SCH MG: 15 TABLET, FILM COATED ORAL at 20:52

## 2019-03-14 RX ADMIN — LEVETIRACETAM SCH MG: 100 SOLUTION ORAL at 20:53

## 2019-03-14 RX ADMIN — Medication SCH EACH: at 08:23

## 2019-03-14 RX ADMIN — Medication SCH MG: at 22:00

## 2019-03-14 RX ADMIN — INSULIN LISPRO PRN UNITS: 100 INJECTION, SOLUTION INTRAVENOUS; SUBCUTANEOUS at 06:38

## 2019-03-14 RX ADMIN — PIPERACILLIN SODIUM AND TAZOBACTAM SODIUM SCH MLS/HR: 2; .25 INJECTION, SOLUTION INTRAVENOUS at 20:52

## 2019-03-14 RX ADMIN — LEVETIRACETAM SCH MG: 100 SOLUTION ORAL at 09:07

## 2019-03-14 RX ADMIN — Medication SCH DEV: at 16:04

## 2019-03-14 RX ADMIN — PIPERACILLIN SODIUM AND TAZOBACTAM SODIUM SCH MLS/HR: 2; .25 INJECTION, SOLUTION INTRAVENOUS at 04:23

## 2019-03-14 RX ADMIN — ACETAMINOPHEN PRN MG: 325 TABLET ORAL at 16:05

## 2019-03-14 RX ADMIN — PIPERACILLIN SODIUM AND TAZOBACTAM SODIUM SCH MLS/HR: 2; .25 INJECTION, SOLUTION INTRAVENOUS at 12:10

## 2019-03-14 RX ADMIN — Medication SCH DEV: at 21:52

## 2019-03-14 RX ADMIN — Medication SCH DEV: at 11:23

## 2019-03-14 RX ADMIN — IPRATROPIUM BROMIDE AND ALBUTEROL SULFATE SCH ML: .5; 3 SOLUTION RESPIRATORY (INHALATION) at 13:15

## 2019-03-14 RX ADMIN — SEVELAMER CARBONATE SCH MG: 800 TABLET, FILM COATED ORAL at 11:55

## 2019-03-14 RX ADMIN — SEVELAMER CARBONATE SCH MG: 800 TABLET, FILM COATED ORAL at 08:23

## 2019-03-14 RX ADMIN — Medication SCH DEV: at 06:37

## 2019-03-14 NOTE — NUR
LUNCH FED 1-1 AT BEDSIDE. PT HAD ABOUT 10% OF LUNCH, REFUSED THE REST. ABLE TO SWALLOW 
NECTAR THICK LIQUID WITH SPOON W/O ANY DIFFICULTY. NO SIGNS OF DISTRESS NOTED.

## 2019-03-14 NOTE — NUR
PT HAD 1 BM THAT IS BROWN AND LOOSE. PT WAS ABLE TO HELP IN TURNING AND REPOSITIONING. PT 
WAS CLEANED AND PADS WERE CHANGED.

## 2019-03-14 NOTE — NUR
PATIENT HAS BEEN SCREENED AND CATEGORIZED AS HIGH NUTRITION RISK. PATIENT WILL BE SEEN 
WITHIN 1-2 DAYS OF ADMISSION.



03/14/19



KEVIN NIEVES RD

## 2019-03-14 NOTE — NUR
SPEECH THERAPIST AT BEDSIDE. PT'S VITAL SIGNS STABLE. NO S/SX OF ACUTE DISTRESS NOTED. WILL 
FOLLOW UP ON DIET RECOMMENDATIONS.

## 2019-03-14 NOTE — NUR
3/14/19 RD INITIAL ASSESSMENT COMPLETED



PLEASE REFER TO NUTRITION ASSESSMENT UNDER CARE ACTIVITY FOR ESTIMATED NUTRITIONAL NEEDS. 



1. CONTINUE MECHANICAL SOFT/GROUND AND NECTAR THICK LIQUIDS RENAL CCHO 60 DIET. FLUID 
RESTRICTION 1500 ML.

2. RECOMMEND NEPRO BID WITH NECTAR THICKENER 

3. CONSIDER TUBE FEEDING IF PO INTAKE IS LOWER THAN 50%

4. RD TO FOLLOW-UP 2-3 DAYS, HIGH RISK 



KEVIN NIEVES, RD

## 2019-03-14 NOTE — NUR
DR. STOUT MADE AWARE OF PT'S EPISODE OF O2 DESATURATION TO 85% WHILE SLEEPING. WENT BACK 
UP TO 92-93% IMMEDIATELY WHEN WOKEN UP. NO NEW ORDER AT THIS TIME.

## 2019-03-14 NOTE — NUR
MEDICATIONS ADMINISTERED AS ORDERED. PT HAS DIFFICULTY SWALLOWING LARGE PILLS. PHARMACY 
NOTIFIED, KEPPRA TABLET CHANGED TO LIQUID FORM. KEPPRA TABLET WASTED, LIQUID ADMINISTERED. 
OTHER PILLS CRUSHED AND ADMINISTERED W/ APPLE SAUCE. PT TOLERATED WELL.

## 2019-03-14 NOTE — NUR
Note:



Per RN Jeanie from [facility] [phone], patient is on a 7 day bed hold and is one 
of their long term patients. She stated patient's daughter Jeanie Toth is her health care 
decision maker.

## 2019-03-14 NOTE — NUR
RECEIVED BEDSIDE REPORT FROM NIGHT SHIFT RN. PT IS ASLEEP, RESPONDS TO LIGHT TOUCH. FOLLOWS 
SIMPLE COMMANDS BY NODDING/SHAKING HEAD. AFEBRILE. DENIES PAIN. SINUS RHYTHM W/ ST 
DEPRESSION AND BBB NOTED ON MONITOR. PT IS ON O2 AT 3 LPM/NC. LUNGS SOUND CLEAR BILATERALLY. 
NO SOB NOTED. ABD SOFT, NONDISTENDED, NONTENDER. PERIPHERAL IV G20 TO RIGHT AC PATENT AND 
INTACT. DIALYSIS AV SHUNT NOTED TO LEFT UPPER ARM. + BRUIT AND THRILL NOTED. PEREZ CATH IN 
PLACE, DRAINING URINE TO GRAVITY. NO EDEMA NOTED. SKIN IS DRY AND WARM TO TOUCH. SCABS TO 
SHOULDERS, LEFT PINKY NECROSIS NOTED. HOB 30 DEGREES, BED IN LOWEST POSITION, CALL LIGHT 
WITHIN REACH. WILL CONTINUE TO MONITOR.

## 2019-03-14 NOTE — NUR
ENDORSED CARE TO INCOMING SHIFT FOR CONTINUITY OF CARE. BED IN LOWEST POSITION. NO SIGNS OF 
ACUTE DISTRESS AT THIS TIME.

## 2019-03-14 NOTE — NUR
S.T. BEDSIDE SWALLOW EVAL COMPLETED

See report for details. Pt presents w/ moderate oropharyngeal dysphagia c/b R facial droop, 

limited dentition and general weakness resulting in poor mastication and 

bolus manipulation of mech soft texures, and coughing after swallow of 

controlled straw sip of thin liquid and nectar thick liquids. No overt s/s aspiration w/ 
spoon sips of nectar thick liquid. Pt is at risk for aspiration.

Recommend:

1) Downgrade diet textures to Mechanical soft ground w/ nectar thick 

liquids via spoon only. No straws.

2) P.O. meds crushed and mixed w/ a puree.

3) 1:1 feeder w/ aspiration precautions

D/w MIRACLE King. No further tx indicated at this time. [place] [facility] [place]



Time [id number]

## 2019-03-14 NOTE — NUR
VERIFIED PHOSLO ORDER WITH DR. MIRAMONTES, MADE HIM AWARE THAT PT IS IN [place]. PER DR. MIRAMONTES, DISCONTINUE PHOSLO. ORDER NOTED.

## 2019-03-14 NOTE — NUR
RECEIVED REPORT FROM MORNING RN, PER, FOR CONTINUITY OF CARE. VS STABLE AT THIS TIME. 
WILL KEEP AN EYE ON BP. AFEBRILE. PT OPENS EYES SPONTANEOUSLY. UNABLE TO VERBALIZE NEEDS OR 
UNDERSTANDING. ABLE TO RESPOND THROUGH SHAKING OF HEAD OR NODDING. PT ABLE TO FOLLOW SIMPLE 
COMMANDS. PERRL. LUNG SOUNDS AUSCULTATED AND WAS CLEAR. S1+S2 HEARD. SR WITH PVC ON MONITOR 
AND ST DEPRESSION. MD AWARE. RESPIRATIONS ARE EVEN AND UNLABORED. OXYGEN AT 2L/MIN VIA NC. 
ABDOMEN ROUND, SOFT AND NONDISTENDED. BS ACTIVE IN ALL QUADRANTS. PEREZ CATHETER IN PLACE. 
DRAINING CLEAR AND LIGHT FRANK URINE. PT HAS PERIPHERAL IV ACCESS ON RIGHT HAND 22G. LINES 
ARE PATENT, INTACT, AND ASYMPTOMATIC. HOB KEPT AT 30 DEGREES. ALL SAFETY PRECAUTIONS ARE IN 
PLACE. WILL CONTINUE TO MONITOR PT.

## 2019-03-14 NOTE — NUR
NOTIFIED DR. GONZALEZ THAT METOPROLOL WAS HELD D/T LOW SBP. NOTIFIED DR. GONZALEZ THAT DR. RODGERS 
IS OKAY TO DOWNGRADE THE PT'S STATUS.

## 2019-03-15 VITALS — DIASTOLIC BLOOD PRESSURE: 68 MMHG | SYSTOLIC BLOOD PRESSURE: 111 MMHG

## 2019-03-15 VITALS — SYSTOLIC BLOOD PRESSURE: 113 MMHG | DIASTOLIC BLOOD PRESSURE: 54 MMHG

## 2019-03-15 VITALS — SYSTOLIC BLOOD PRESSURE: 110 MMHG | DIASTOLIC BLOOD PRESSURE: 68 MMHG

## 2019-03-15 VITALS — SYSTOLIC BLOOD PRESSURE: 122 MMHG | DIASTOLIC BLOOD PRESSURE: 57 MMHG

## 2019-03-15 VITALS — SYSTOLIC BLOOD PRESSURE: 111 MMHG | DIASTOLIC BLOOD PRESSURE: 58 MMHG

## 2019-03-15 VITALS — SYSTOLIC BLOOD PRESSURE: 108 MMHG | DIASTOLIC BLOOD PRESSURE: 63 MMHG

## 2019-03-15 VITALS — SYSTOLIC BLOOD PRESSURE: 106 MMHG | DIASTOLIC BLOOD PRESSURE: 60 MMHG

## 2019-03-15 VITALS — SYSTOLIC BLOOD PRESSURE: 126 MMHG | DIASTOLIC BLOOD PRESSURE: 69 MMHG

## 2019-03-15 VITALS — DIASTOLIC BLOOD PRESSURE: 48 MMHG | SYSTOLIC BLOOD PRESSURE: 93 MMHG

## 2019-03-15 VITALS — DIASTOLIC BLOOD PRESSURE: 70 MMHG | SYSTOLIC BLOOD PRESSURE: 132 MMHG

## 2019-03-15 VITALS — DIASTOLIC BLOOD PRESSURE: 57 MMHG | SYSTOLIC BLOOD PRESSURE: 93 MMHG

## 2019-03-15 VITALS — SYSTOLIC BLOOD PRESSURE: 136 MMHG | DIASTOLIC BLOOD PRESSURE: 64 MMHG

## 2019-03-15 LAB
ANION GAP SERPL CALCULATED.3IONS-SCNC: 17.3 MMOL/L (ref 8–16)
BASOPHILS # BLD AUTO: 0 K/UL (ref 0–0.22)
BASOPHILS NFR BLD AUTO: 0.5 % (ref 0–2)
BUN SERPL-MCNC: 72 MG/DL (ref 7–18)
CHLORIDE SERPL-SCNC: 93 MMOL/L (ref 98–107)
CO2 SERPL-SCNC: 29.8 MMOL/L (ref 21–32)
CREAT SERPL-MCNC: 6.8 MG/DL (ref 0.6–1.3)
EOSINOPHIL # BLD AUTO: 0.2 K/UL (ref 0–0.4)
EOSINOPHIL NFR BLD AUTO: 2.8 % (ref 0–4)
ERYTHROCYTE [DISTWIDTH] IN BLOOD BY AUTOMATED COUNT: 16 % (ref 11.6–13.7)
GFR SERPL CREATININE-BSD FRML MDRD: 8 ML/MIN (ref 90–?)
GLUCOSE SERPL-MCNC: 162 MG/DL (ref 74–106)
HCT VFR BLD AUTO: 32.6 % (ref 36–48)
HGB BLD-MCNC: 10.3 G/DL (ref 12–16)
LYMPHOCYTES # BLD AUTO: 1 K/UL (ref 2.5–16.5)
LYMPHOCYTES NFR BLD AUTO: 12.4 % (ref 20.5–51.1)
MCH RBC QN AUTO: 27 PG (ref 27–31)
MCHC RBC AUTO-ENTMCNC: 32 G/DL (ref 33–37)
MCV RBC AUTO: 85.6 FL (ref 80–94)
MONOCYTES # BLD AUTO: 0.9 K/UL (ref 0.8–1)
MONOCYTES NFR BLD AUTO: 11.4 % (ref 1.7–9.3)
NEUTROPHILS # BLD AUTO: 5.7 K/UL (ref 1.8–7.7)
NEUTROPHILS NFR BLD AUTO: 72.9 % (ref 42.2–75.2)
PLATELET # BLD AUTO: 139 K/UL (ref 140–450)
POTASSIUM SERPL-SCNC: 5.1 MMOL/L (ref 3.5–5.1)
RBC # BLD AUTO: 3.81 MIL/UL (ref 4.2–5.4)
SODIUM SERPL-SCNC: 135 MMOL/L (ref 136–145)
WBC # BLD AUTO: 7.9 K/UL (ref 4.8–10.8)

## 2019-03-15 PROCEDURE — 5A1D70Z PERFORMANCE OF URINARY FILTRATION, INTERMITTENT, LESS THAN 6 HOURS PER DAY: ICD-10-PCS

## 2019-03-15 RX ADMIN — PIPERACILLIN SODIUM AND TAZOBACTAM SODIUM SCH MLS/HR: 2; .25 INJECTION, SOLUTION INTRAVENOUS at 14:08

## 2019-03-15 RX ADMIN — AMIODARONE HYDROCHLORIDE SCH MG: 200 TABLET ORAL at 10:29

## 2019-03-15 RX ADMIN — IPRATROPIUM BROMIDE AND ALBUTEROL SULFATE SCH ML: .5; 3 SOLUTION RESPIRATORY (INHALATION) at 13:07

## 2019-03-15 RX ADMIN — PIPERACILLIN SODIUM AND TAZOBACTAM SODIUM SCH MLS/HR: 2; .25 INJECTION, SOLUTION INTRAVENOUS at 21:52

## 2019-03-15 RX ADMIN — ATORVASTATIN CALCIUM SCH MG: 20 TABLET, FILM COATED ORAL at 21:51

## 2019-03-15 RX ADMIN — LEVETIRACETAM SCH MG: 100 SOLUTION ORAL at 08:10

## 2019-03-15 RX ADMIN — Medication SCH DEV: at 21:52

## 2019-03-15 RX ADMIN — Medication SCH EACH: at 08:11

## 2019-03-15 RX ADMIN — INSULIN LISPRO PRN UNITS: 100 INJECTION, SOLUTION INTRAVENOUS; SUBCUTANEOUS at 12:04

## 2019-03-15 RX ADMIN — SEVELAMER CARBONATE SCH MG: 800 TABLET, FILM COATED ORAL at 16:12

## 2019-03-15 RX ADMIN — AMIODARONE HYDROCHLORIDE SCH MG: 200 TABLET ORAL at 21:00

## 2019-03-15 RX ADMIN — IPRATROPIUM BROMIDE AND ALBUTEROL SULFATE SCH ML: .5; 3 SOLUTION RESPIRATORY (INHALATION) at 20:00

## 2019-03-15 RX ADMIN — Medication SCH TAB: at 21:51

## 2019-03-15 RX ADMIN — Medication SCH DEV: at 10:32

## 2019-03-15 RX ADMIN — Medication SCH DEV: at 16:12

## 2019-03-15 RX ADMIN — SEVELAMER CARBONATE SCH MG: 800 TABLET, FILM COATED ORAL at 11:56

## 2019-03-15 RX ADMIN — IPRATROPIUM BROMIDE AND ALBUTEROL SULFATE SCH ML: .5; 3 SOLUTION RESPIRATORY (INHALATION) at 06:54

## 2019-03-15 RX ADMIN — Medication SCH MG: at 21:00

## 2019-03-15 RX ADMIN — PIPERACILLIN SODIUM AND TAZOBACTAM SODIUM SCH MLS/HR: 2; .25 INJECTION, SOLUTION INTRAVENOUS at 04:33

## 2019-03-15 RX ADMIN — INSULIN LISPRO PRN UNITS: 100 INJECTION, SOLUTION INTRAVENOUS; SUBCUTANEOUS at 21:54

## 2019-03-15 RX ADMIN — MIRTAZAPINE SCH MG: 15 TABLET, FILM COATED ORAL at 21:51

## 2019-03-15 RX ADMIN — ACETAMINOPHEN PRN MG: 325 TABLET ORAL at 10:29

## 2019-03-15 RX ADMIN — INSULIN LISPRO PRN UNITS: 100 INJECTION, SOLUTION INTRAVENOUS; SUBCUTANEOUS at 07:49

## 2019-03-15 RX ADMIN — Medication SCH MG: at 08:11

## 2019-03-15 RX ADMIN — INSULIN GLARGINE SCH UNITS: 100 INJECTION, SOLUTION SUBCUTANEOUS at 08:18

## 2019-03-15 RX ADMIN — Medication SCH DEV: at 07:24

## 2019-03-15 RX ADMIN — SEVELAMER CARBONATE SCH MG: 800 TABLET, FILM COATED ORAL at 08:10

## 2019-03-15 RX ADMIN — FAMOTIDINE SCH MG: 20 TABLET ORAL at 08:10

## 2019-03-15 RX ADMIN — LEVETIRACETAM SCH MG: 100 SOLUTION ORAL at 21:51

## 2019-03-15 NOTE — NUR
PT TURNED AND REPOSITIONED. NOTED WITH 1 SMALL BM THAT IS BROWN AND SOFT. PT TOLERATED BEING 
TURNED FAIRLY.

## 2019-03-15 NOTE — NUR
Per patient's daughter who is at bedside that patient can tolerate swallowing whole pills. 
Informed that order is mechanical soft as ordered

## 2019-03-15 NOTE — NUR
RECEIVED BEDSIDE REPORT, PATIENT NON VERBAL, ABLE TO FOLLOW SIMPLE COMMANDS SOMETIMES. NO 
ACUTE DISTRESS NOTED. BILATERAL  LUNGS SOUND CLEAR WITH O2 2L/M VIA NC WITH O2 SAT ABOVE 
95%. SR WITH BBB WITH HR 70'S. SALINE LOCK TO LEFT HAND 22G, INTACT AND PATENT NOTED. PEREZ 
CATH IN PLACE, CLEAR YELLOW URINE NOTED ON THE CATHETER TUBE. PATIENT ESRD WITH HEMODIALYSIS 
WITH OLIGURIA. SKIN IS WARM TO TOUCH, SEE SKIN ASSESSMENT NOTE FOR DETAIL. HOB ELEVATED, BED 
IN LOW POSITION, CALL LIGHT WITHIN REACH. WILL CONTINUE TO MONITOR.  

-------------------------------------------------------------------------------

Addendum: 03/16/19 at 0604 by David Cortes RN

-------------------------------------------------------------------------------

AV FISTULA TO LEFT UPPER ARM, THRILL AND BRUIT POSITIVE NOTED.

## 2019-03-15 NOTE — NUR
PT TURNED AND REPOSITIONED. NO BM NOTED AT THIS TIME. HOB KEPT AT 30 DEGREES. BED BATH 
PROVIDED. ALL SAFETY PRECAUTIONS ARE IN PLACE. WILL CONTINUE TO MONITOR PT.

## 2019-03-15 NOTE — NUR
PATIENT OPENS EYES TO VOICE, MAKES MUMBLING SOUND, LOCALIZES TO PAIN, ON ROOM AIR SO 96%, 
NOT IN RESPIRATORY DISTRESS, SINUS RHYTHM ON MONITOR , TOLERATING MECHANICAL SOFT DIET WITH 
MAXIMUM ASSISTANCE, HEAD OF BED UP 90 DEGREES AT THIS TIME, WITH PEREZ CATHETER NO OUTPUT AT 
THIS TIME, LEFT ARM AV FISTULA, LEFT PINKY NECROSIS, GENERALIZED SCABS, RIGHT HAND 
PERIPHERAL LINE GAUGE 22 AND SITE ASYMPTOMATIC

## 2019-03-15 NOTE — NUR
DR. STOUT AT BEDSIDE

-------------------------------------------------------------------------------

Addendum: 03/15/19 at 1129 by Jacquelyn Hong RN

-------------------------------------------------------------------------------

CORRECTION/WRONG TIME



DR. STOUT AT BEDSIDE AT 0900HOURS

## 2019-03-15 NOTE — NUR
WOUND CARE EVALUATION NOTE:

REASON FOR EVALUATION: LOW MATTHIAS SCALE

SKIN ASSESSMENT DONE WITH PRIMARY RN, NO REDNESS, NO OPEN AREA, SKIN WARM AND DRY WITH 
MULTIPLE DRY SCAB R/T CHRONIC RENAL FAILURE ON HD.

-GANGRENE LEFT HAND 5TH DIGIT DISTAL PHALANGE 2X1CM BENJAMÍN WOUND SKIN DRY AND INTACT.

-DIABETIC ULCER RIGHT 5TH DIGIT DISTAL PHALANGE 0.5X0.5 CM DARK BROWN SCABS BENJAMÍN WOUND SKIN 
INTACT

RECOMMENDATIONS:

-PAINT LEFT 5TH FINGER AND RIGHT 5TH TOE WITH BETADINE SOL. BIDWC AND LEAVE IT OPEN TO AIR

-TURN AND REPOSITION PATIENT Q2H TO LEFT AND RIGHT SIDE ONLY TO OFFLOAD SACRALCOCCYX

-ASSESS AND MONITOR SKIN CONDITION DURING POSITION CHANGE, PLEASE PAY PARTICULAR ATTENTION 
TO SACRALCOCCYX, ELBOWS AND HEELS

-OFFLOAD BILATERAL HEELS BY PLACING PILLOWS UNDER CALVES AT ALL TIMES, UNLESS OTHERWISE 
CONTRAINDICATED

-KEEP SKIN CLEAN AND DRY AT ALL TIMES. 



RECOMMENDATIONS DISCUSSED WITH PRIMARY RN

## 2019-03-15 NOTE — NUR
PHONE CALL TO DR RODGERS, CARDIOLOGIST REGARDING SHORT RUNS OF VTACH THIS SHIFT; ORDERED TO 
START AMIODARONE DRIP PER PROTOCOL; DR BOGGS; RESIDENT DOCTOR IN THE UNIT AND AWARE.

## 2019-03-15 NOTE — NUR
ADMINISTERED SCHEDULED MEDICATIONS AS ORDERED, PATIENT TOLERATED WELL WITH PO MEDS. BS 
CHECKED 171 NOTED, ADMINISTERED HUMALOG 2 UNITS. WILL CONTINUE TO MONITOR.

## 2019-03-15 NOTE — NUR
AT BEDSIDE TO CHECK THE PATIENT, PATIENT'S /54,  STATED OK TO HOLD 
AMIODARONE AND LOPRESSOR AT THIS TIME.

## 2019-03-15 NOTE — NUR
DR. STATON AT BEDSIDE TO SEE PT. UPDATED HIM REGARDING PT'S CONDITION. WILL CONTINUE TO 
MONITOR PT.

## 2019-03-16 VITALS — DIASTOLIC BLOOD PRESSURE: 56 MMHG | SYSTOLIC BLOOD PRESSURE: 101 MMHG

## 2019-03-16 VITALS — SYSTOLIC BLOOD PRESSURE: 111 MMHG | DIASTOLIC BLOOD PRESSURE: 58 MMHG

## 2019-03-16 VITALS — DIASTOLIC BLOOD PRESSURE: 68 MMHG | SYSTOLIC BLOOD PRESSURE: 101 MMHG

## 2019-03-16 VITALS — DIASTOLIC BLOOD PRESSURE: 64 MMHG | SYSTOLIC BLOOD PRESSURE: 94 MMHG

## 2019-03-16 VITALS — SYSTOLIC BLOOD PRESSURE: 116 MMHG | DIASTOLIC BLOOD PRESSURE: 76 MMHG

## 2019-03-16 VITALS — DIASTOLIC BLOOD PRESSURE: 56 MMHG | SYSTOLIC BLOOD PRESSURE: 113 MMHG

## 2019-03-16 VITALS — SYSTOLIC BLOOD PRESSURE: 101 MMHG | DIASTOLIC BLOOD PRESSURE: 53 MMHG

## 2019-03-16 VITALS — SYSTOLIC BLOOD PRESSURE: 125 MMHG | DIASTOLIC BLOOD PRESSURE: 72 MMHG

## 2019-03-16 VITALS — DIASTOLIC BLOOD PRESSURE: 61 MMHG | SYSTOLIC BLOOD PRESSURE: 110 MMHG

## 2019-03-16 VITALS — DIASTOLIC BLOOD PRESSURE: 28 MMHG | SYSTOLIC BLOOD PRESSURE: 109 MMHG

## 2019-03-16 VITALS — DIASTOLIC BLOOD PRESSURE: 71 MMHG | SYSTOLIC BLOOD PRESSURE: 110 MMHG

## 2019-03-16 VITALS — SYSTOLIC BLOOD PRESSURE: 131 MMHG | DIASTOLIC BLOOD PRESSURE: 81 MMHG

## 2019-03-16 VITALS — DIASTOLIC BLOOD PRESSURE: 62 MMHG | SYSTOLIC BLOOD PRESSURE: 126 MMHG

## 2019-03-16 VITALS — SYSTOLIC BLOOD PRESSURE: 96 MMHG | DIASTOLIC BLOOD PRESSURE: 62 MMHG

## 2019-03-16 RX ADMIN — Medication SCH DEV: at 07:12

## 2019-03-16 RX ADMIN — IPRATROPIUM BROMIDE AND ALBUTEROL SULFATE SCH ML: .5; 3 SOLUTION RESPIRATORY (INHALATION) at 19:27

## 2019-03-16 RX ADMIN — SEVELAMER CARBONATE SCH MG: 800 TABLET, FILM COATED ORAL at 08:12

## 2019-03-16 RX ADMIN — MIRTAZAPINE SCH MG: 15 TABLET, FILM COATED ORAL at 21:36

## 2019-03-16 RX ADMIN — PIPERACILLIN SODIUM AND TAZOBACTAM SODIUM SCH MLS/HR: 2; .25 INJECTION, SOLUTION INTRAVENOUS at 21:39

## 2019-03-16 RX ADMIN — PIPERACILLIN SODIUM AND TAZOBACTAM SODIUM SCH MLS/HR: 2; .25 INJECTION, SOLUTION INTRAVENOUS at 13:44

## 2019-03-16 RX ADMIN — IPRATROPIUM BROMIDE AND ALBUTEROL SULFATE SCH ML: .5; 3 SOLUTION RESPIRATORY (INHALATION) at 13:00

## 2019-03-16 RX ADMIN — INSULIN GLARGINE SCH UNITS: 100 INJECTION, SOLUTION SUBCUTANEOUS at 08:30

## 2019-03-16 RX ADMIN — AMIODARONE HYDROCHLORIDE SCH MG: 200 TABLET ORAL at 08:26

## 2019-03-16 RX ADMIN — Medication SCH MG: at 21:00

## 2019-03-16 RX ADMIN — FAMOTIDINE SCH MG: 20 TABLET ORAL at 08:12

## 2019-03-16 RX ADMIN — SODIUM CHLORIDE SCH MLS/HR: 9 INJECTION, SOLUTION INTRAVENOUS at 16:44

## 2019-03-16 RX ADMIN — Medication SCH DEV: at 16:44

## 2019-03-16 RX ADMIN — Medication SCH DEV: at 21:35

## 2019-03-16 RX ADMIN — SODIUM CHLORIDE SCH MLS/HR: 9 INJECTION, SOLUTION INTRAVENOUS at 13:36

## 2019-03-16 RX ADMIN — ATORVASTATIN CALCIUM SCH MG: 20 TABLET, FILM COATED ORAL at 21:36

## 2019-03-16 RX ADMIN — Medication SCH EACH: at 08:12

## 2019-03-16 RX ADMIN — PIPERACILLIN SODIUM AND TAZOBACTAM SODIUM SCH MLS/HR: 2; .25 INJECTION, SOLUTION INTRAVENOUS at 05:09

## 2019-03-16 RX ADMIN — INSULIN LISPRO PRN UNITS: 100 INJECTION, SOLUTION INTRAVENOUS; SUBCUTANEOUS at 16:48

## 2019-03-16 RX ADMIN — SEVELAMER CARBONATE SCH MG: 800 TABLET, FILM COATED ORAL at 12:00

## 2019-03-16 RX ADMIN — IBUPROFEN PRN MG: 600 TABLET ORAL at 01:13

## 2019-03-16 RX ADMIN — Medication SCH MG: at 08:13

## 2019-03-16 RX ADMIN — Medication SCH TAB: at 21:36

## 2019-03-16 RX ADMIN — Medication SCH DEV: at 11:55

## 2019-03-16 RX ADMIN — IPRATROPIUM BROMIDE AND ALBUTEROL SULFATE SCH ML: .5; 3 SOLUTION RESPIRATORY (INHALATION) at 06:26

## 2019-03-16 RX ADMIN — SEVELAMER CARBONATE SCH MG: 800 TABLET, FILM COATED ORAL at 17:25

## 2019-03-16 RX ADMIN — AMIODARONE HYDROCHLORIDE SCH MG: 200 TABLET ORAL at 21:00

## 2019-03-16 RX ADMIN — LEVETIRACETAM SCH MG: 100 SOLUTION ORAL at 21:36

## 2019-03-16 RX ADMIN — LEVETIRACETAM SCH MG: 100 SOLUTION ORAL at 08:12

## 2019-03-16 NOTE — NUR
PATIENT IN ASLEEP, AROUSABLE TO NAME. NO ACUTE DISTRESS NOTED. FLACC 0. VSS. WILL CONTINUE 
TO MONITOR.

## 2019-03-16 NOTE — NUR
Dr. Alegre contacted and informed that patient's amiodarone being on hold due to patient 
been bradycardic in the 50s. Informed physician also that blood pressure on the low side and 
per physician if  patient gets hypotensive may give 250 cc normal saline bolus IV-readback 
and verified

## 2019-03-16 NOTE — NUR
RECEIVED BEDSIDE REPORT, PATIENT NON VERBAL, ABLE TO FOLLOW SIMPLE COMMANDS SOMETIMES. NO 
ACUTE DISTRESS NOTED. BILATERAL LUNGS SOUND CLEAR WITH O2 2L/M VIA NC WITH O2 SAT ABOVE 95%. 
SR WITH BBB WITH HR 60'S. PERIPHERAL LINE TO RIGHT HAND 22G AND SALINE LOCK TO RIGHT FOREARM 
20G INTACT AND PATENT NOTED, RUNNING NS 60ML/HR. AV FISTULA TO LEFT UPPER ARM, THRILL AND 
BRUIT POSITIVE NOTED. PEREZ CATH IN PLACE, CLEAR YELLOW URINE NOTED ON THE CATHETER TUBE. 
PATIENT ESRD WITH HEMODIALYSIS WITH OLIGURIA. SKIN IS WARM TO TOUCH, SEE SKIN ASSESSMENT 
NOTE FOR DETAIL. HOB ELEVATED, BED IN LOW POSITION, CALL LIGHT WITHIN REACH. WILL CONTINUE 
TO MONITOR.

## 2019-03-16 NOTE — NUR
RN Hugh here and inserted gauge 20 at right arm with good blood backflow noted. CT 
department did not accept the IV line in the right hand

## 2019-03-16 NOTE — NUR
Dr. Rios at bedside. Informed patient's vital signs trend. Per physician patient will stay 
another day in ICU for close monitoring

## 2019-03-16 NOTE — NUR
IV insertion



gauge 20 at right hand on first attempt using aseptic technique with good blood back flow 
noted. New IV will be used for CT scan with contrast

## 2019-03-16 NOTE — NUR
ATIENT OPENS EYES TO VOICE, MAKES MUMBLING SOUND, LOCALIZES TO PAIN, ON NASAL CANNULA 3L/MIN 
SO2 95%, NOT IN RESPIRATORY DISTRESS, SINUS RHYTHM ON MONITOR , TOLERATING MECHANICAL SOFT 
DIET WITH MAXIMUM ASSISTANCE, HEAD OF BED UP 90 DEGREES AT THIS TIME, WITH PEREZ CATHETER NO 
OUTPUT AT THIS TIME, LEFT ARM AV FISTULA WITH BRUIT AND THRILL, LEFT PINKY NECROSIS, 
GENERALIZED SCABS, RIGHT HAND PERIPHERAL LINE GAUGE 22 AND SITE ASYMPTOMATIC

-------------------------------------------------------------------------------

Addendum: 03/17/19 at 1420 by Jacquelyn Hong RN

-------------------------------------------------------------------------------

"PATIENT"

## 2019-03-16 NOTE — NUR
Informed Dr. Romero (covering Dr. Vargas) that CT technician won't take patient as the IV line 
at right arm inserted by ER RN Hugh got infiltrated

## 2019-03-16 NOTE — NUR
ADMINISTERED IV ABX AS ORDERED, PROVIDED MORNING CARE. PATIENT TOLERATED WELL. NO ACUTE 
DISTRESS. FLACC 0. URINE OUTPUT LESS THAN 50CC NOTED. WILL CONTINUE TO MONITOR.

## 2019-03-16 NOTE — NUR
per Dr. Vargas hold off on the CT angio for now as patient not stable with bradycardia as low 
as 48 and borderline hypotension SBP in the 90s

## 2019-03-16 NOTE — NUR
PATIENT CRYING WITH POINTING HER LEFT UPPER ARM AV FISTULA, ADMINISTERED PRN MOTRIN AS 
ORDERED. FLACC 3 NOTED. VSS WITH SR WITH BBB ON THE MONITOR. WILL CONTINUE TO MONITOR.

## 2019-03-16 NOTE — NUR
Attempted to call patient's daughter's number found in facesheet and left a message for call 
back. Plan for patient to do CT angio of left hand

## 2019-03-16 NOTE — NUR
NOTIFIED TO  ABOUT PATIENT'S /54 AND HR 66. HOLD 2100 METOPROLOL AND 
AMIODARONE 400MG AND ADMINISTERED ONLY AMIODARONE 200MG ONE TIME DOSE. PATIENT TOLERATED 
WELL WITH MEDICATIONS. NO ACUTE DISTRESS NOTED. WILL CONTINUE TO MONITOR. 

-------------------------------------------------------------------------------

Addendum: 03/17/19 at 0400 by David Cortes RN

-------------------------------------------------------------------------------

 NOTED.

## 2019-03-17 VITALS — SYSTOLIC BLOOD PRESSURE: 111 MMHG | DIASTOLIC BLOOD PRESSURE: 60 MMHG

## 2019-03-17 VITALS — SYSTOLIC BLOOD PRESSURE: 105 MMHG | DIASTOLIC BLOOD PRESSURE: 62 MMHG

## 2019-03-17 VITALS — DIASTOLIC BLOOD PRESSURE: 74 MMHG | SYSTOLIC BLOOD PRESSURE: 119 MMHG

## 2019-03-17 VITALS — DIASTOLIC BLOOD PRESSURE: 54 MMHG | SYSTOLIC BLOOD PRESSURE: 102 MMHG

## 2019-03-17 VITALS — DIASTOLIC BLOOD PRESSURE: 57 MMHG | SYSTOLIC BLOOD PRESSURE: 133 MMHG

## 2019-03-17 VITALS — SYSTOLIC BLOOD PRESSURE: 133 MMHG | DIASTOLIC BLOOD PRESSURE: 57 MMHG

## 2019-03-17 VITALS — DIASTOLIC BLOOD PRESSURE: 67 MMHG | SYSTOLIC BLOOD PRESSURE: 125 MMHG

## 2019-03-17 VITALS — SYSTOLIC BLOOD PRESSURE: 108 MMHG | DIASTOLIC BLOOD PRESSURE: 53 MMHG

## 2019-03-17 LAB
ANION GAP SERPL CALCULATED.3IONS-SCNC: 15.2 MMOL/L (ref 8–16)
BASOPHILS # BLD AUTO: 0.1 K/UL (ref 0–0.22)
BASOPHILS NFR BLD AUTO: 0.7 % (ref 0–2)
BUN SERPL-MCNC: 58 MG/DL (ref 7–18)
CHLORIDE SERPL-SCNC: 99 MMOL/L (ref 98–107)
CO2 SERPL-SCNC: 27.9 MMOL/L (ref 21–32)
CREAT SERPL-MCNC: 5.8 MG/DL (ref 0.6–1.3)
EOSINOPHIL # BLD AUTO: 0.5 K/UL (ref 0–0.4)
EOSINOPHIL NFR BLD AUTO: 6 % (ref 0–4)
ERYTHROCYTE [DISTWIDTH] IN BLOOD BY AUTOMATED COUNT: 15.7 % (ref 11.6–13.7)
GFR SERPL CREATININE-BSD FRML MDRD: 10 ML/MIN (ref 90–?)
GLUCOSE SERPL-MCNC: 132 MG/DL (ref 74–106)
HCT VFR BLD AUTO: 37 % (ref 36–48)
HGB BLD-MCNC: 11.7 G/DL (ref 12–16)
LYMPHOCYTES # BLD AUTO: 1.3 K/UL (ref 2.5–16.5)
LYMPHOCYTES NFR BLD AUTO: 17 % (ref 20.5–51.1)
MAGNESIUM SERPL-MCNC: 2.4 MG/DL (ref 1.8–2.4)
MCH RBC QN AUTO: 27 PG (ref 27–31)
MCHC RBC AUTO-ENTMCNC: 32 G/DL (ref 33–37)
MCV RBC AUTO: 85.5 FL (ref 80–94)
MONOCYTES # BLD AUTO: 0.7 K/UL (ref 0.8–1)
MONOCYTES NFR BLD AUTO: 9.5 % (ref 1.7–9.3)
NEUTROPHILS # BLD AUTO: 5.2 K/UL (ref 1.8–7.7)
NEUTROPHILS NFR BLD AUTO: 66.8 % (ref 42.2–75.2)
PHOSPHATE SERPL-MCNC: 6.5 MG/DL (ref 2.5–4.9)
PLATELET # BLD AUTO: 178 K/UL (ref 140–450)
POTASSIUM SERPL-SCNC: 4.1 MMOL/L (ref 3.5–5.1)
RBC # BLD AUTO: 4.33 MIL/UL (ref 4.2–5.4)
SODIUM SERPL-SCNC: 138 MMOL/L (ref 136–145)
WBC # BLD AUTO: 7.8 K/UL (ref 4.8–10.8)

## 2019-03-17 RX ADMIN — FAMOTIDINE SCH MG: 20 TABLET ORAL at 09:10

## 2019-03-17 RX ADMIN — LEVETIRACETAM SCH MG: 100 SOLUTION ORAL at 09:11

## 2019-03-17 RX ADMIN — SEVELAMER CARBONATE SCH MG: 800 TABLET, FILM COATED ORAL at 12:22

## 2019-03-17 RX ADMIN — PIPERACILLIN SODIUM AND TAZOBACTAM SODIUM SCH MLS/HR: 2; .25 INJECTION, SOLUTION INTRAVENOUS at 04:58

## 2019-03-17 RX ADMIN — IPRATROPIUM BROMIDE AND ALBUTEROL SULFATE SCH ML: .5; 3 SOLUTION RESPIRATORY (INHALATION) at 13:00

## 2019-03-17 RX ADMIN — INSULIN GLARGINE SCH UNITS: 100 INJECTION, SOLUTION SUBCUTANEOUS at 09:12

## 2019-03-17 RX ADMIN — SEVELAMER CARBONATE SCH MG: 800 TABLET, FILM COATED ORAL at 09:10

## 2019-03-17 RX ADMIN — Medication SCH DEV: at 12:21

## 2019-03-17 RX ADMIN — Medication SCH MG: at 20:54

## 2019-03-17 RX ADMIN — MIRTAZAPINE SCH MG: 15 TABLET, FILM COATED ORAL at 20:55

## 2019-03-17 RX ADMIN — PIPERACILLIN SODIUM AND TAZOBACTAM SODIUM SCH MLS/HR: 2; .25 INJECTION, SOLUTION INTRAVENOUS at 20:54

## 2019-03-17 RX ADMIN — AMIODARONE HYDROCHLORIDE SCH MG: 200 TABLET ORAL at 09:00

## 2019-03-17 RX ADMIN — Medication SCH MG: at 09:11

## 2019-03-17 RX ADMIN — IPRATROPIUM BROMIDE AND ALBUTEROL SULFATE SCH ML: .5; 3 SOLUTION RESPIRATORY (INHALATION) at 19:23

## 2019-03-17 RX ADMIN — SEVELAMER CARBONATE SCH MG: 800 TABLET, FILM COATED ORAL at 16:54

## 2019-03-17 RX ADMIN — IPRATROPIUM BROMIDE AND ALBUTEROL SULFATE SCH ML: .5; 3 SOLUTION RESPIRATORY (INHALATION) at 06:26

## 2019-03-17 RX ADMIN — Medication SCH EACH: at 09:11

## 2019-03-17 RX ADMIN — LEVETIRACETAM SCH MG: 100 SOLUTION ORAL at 20:55

## 2019-03-17 RX ADMIN — ATORVASTATIN CALCIUM SCH MG: 20 TABLET, FILM COATED ORAL at 20:54

## 2019-03-17 RX ADMIN — Medication SCH DEV: at 16:54

## 2019-03-17 RX ADMIN — SODIUM CHLORIDE SCH MLS/HR: 9 INJECTION, SOLUTION INTRAVENOUS at 04:58

## 2019-03-17 RX ADMIN — Medication SCH DEV: at 07:26

## 2019-03-17 RX ADMIN — Medication SCH TAB: at 20:54

## 2019-03-17 RX ADMIN — Medication SCH DEV: at 20:22

## 2019-03-17 NOTE — NUR
FOR TRANSFER TO TELE ROOM 108 B; PATIENT'S DAUGHTER ADAM ROGEL WAS INFORMED AND NOTIFIED 
BY TELEPHONE.

## 2019-03-17 NOTE — NUR
PATIENT OPENS EYES TO VOICE, MAKES MUMBLING SOUND, LOCALIZES TO PAIN, ON NASAL CANNULA 
2L/MIN SO2 95%, NOT IN RESPIRATORY DISTRESS, SINUS BRADYCARDIA ON MONITOR , TOLERATING 
MECHANICAL SOFT DIET WITH MAXIMUM ASSISTANCE, HEAD OF BED UP 90 DEGREES AT THIS TIME, WITH 
PEREZ CATHETER NO OUTPUT AT THIS TIME, LEFT ARM AV FISTULA WITH BRUIT AND THRILL, LEFT PINKY 
NECROSIS, GENERALIZED SCABS,  PERIPHERAL LINES: RIGHT HAND GAUGE 22, AND GAUGE 20-NORMAL 
SALINE 60 CC/HR. INFUSING, RIGHT FOREARM GAUGE 20- SITES ASYMPTOMATIC

## 2019-03-17 NOTE — NUR
SPOKE WITH CT TECH FOR CT ANGIO UPPER EX, PATIENT NEEDS A GOOD BLOOD RETURN PERIPHERAL IV 
LINE. ATTEMPTED TO INSERT IV LINE TO RIGHT AC 2 TIMES, FAILED. WILL FOLLOW UP WITH CT.

## 2019-03-17 NOTE — NUR
RECEIVED BEDSIDE REPORT FROM GOPI RAUSCH. PT IS APHASIC. OPEN EYES SPONTANEOUSLY. ON NC 2L. NO 
S/S OF RESPIRATORY DISTRESS. LUNG SOUNDS ARE CLEAR. PT WITH IV ON  R WRIST 22G INFUSING IVF. 
RFA 20G SL. PT HAS LEFT 5 TH DIGIT NECROSIS AND R TOE NECROTIC PER NURSE US SHOWED NO 
BRACHIAL STENOSIS. PT IS ON RENAL SOFT DIET. AV SHUNT ON LEFT UPPER ARM. HD ON M-W-F. 
HEMODIALYSIS TOMORROW. LAST HD 3/15: OUTPUT 2.3L. PT IS INCONTINENT. HAS SCHEDULED RANDOM 
VANCO 3-18-19 @ 0500. SAFETY MEASURES IN PLACE. BED ALARM ON AND HOB AT 45 DEGREES. WILL 
CONTINUE TO MONITOR.

## 2019-03-17 NOTE — NUR
RECEIVED BEDSIDE REPORT FROM MORNING SHIFT RNGRICEL. 

TEMP 98.4, HR=74, SATING 94%, RR=16 IY=591/72. PT IS SLEEPING, EASY TO AROUSE.PERRL, 
APHASIC, OPENS EYES SPONTANEOUSLY. LUNG SOUND CLEAR ON UPPER/LOWER BILATERAL LOBES. ON 2L 
N/C. SR ON CARDIAC MONITOR. BOWEL SOUNDS ACTIVE X4. NO PEREZ IN PLACE. RFA 20 GAUGE AND 
RIGHT WRIST 22 GAUGE, INFUSING NS AT 60ML/HR. SKIN IS NONINTACT. NECROSIS NOTED ON LEFT 
PINKY AND RIGHT PINKY TOE. 

STANDARD PRECAUTIONS, FALL RISK PRECAUTIONS, MAINTAINED. HOB ELEVATED, SIDE RAILS UP X4.

## 2019-03-17 NOTE — NUR
MEDS GIVEN, CRUSHED AND GIVEN WITH APPLESAUCE AND NECTAR THICK LIQUID/WATER. TOLERATED WELL. 
PT REPOSITIONED, HOB ELEVATED, PILLOW SUPPORT PROVIDED. FLACC 0, PT IS APHASIC MOANS AND 
OPENS EYES SPONTANEOUSLY.

## 2019-03-17 NOTE — NUR
NO ACUTE DISTRESS NOTED. PATIENT IN ASLEEP AT THIS TIME. WILL CONTINUE TO MONITOR. SR WITH 
HR 60'S ON THE MONITOR.

## 2019-03-17 NOTE — NUR
PATIENT IN ASLEEP AT THIS TIME, AROUSABLE TO VOICE. VSS. NO ACUTE DISTRESS NOTED. DENIES 
PAIN. WILL CONTINUE TO MONITOR.

## 2019-03-17 NOTE — NUR
3/17/19 RD FOLLOW UP COMPLETED

PLEASE REFER TO NUTRITION PROGRESS NOTE UNDER CARE ACTIVITY FOR ESTIMATED NUTRITION NEEDS.

RD RECOMMENDATIONS:



1. CONTINUE MECHANICAL SOFT/GROUND AND NECTAR THICK LIQUIDS RENAL CCHO 60 

DIET. FLUID RESCTRICTION 1500 ML + NEPRO BID. 



2. RD TO FOLLOW-UP 3-5 DAYS, MODERATE RISK 



ANDREW MEJIAS, RD

## 2019-03-17 NOTE — NUR
VITAL SIGNS ARE STABLE: 125/77 HR 66, 100% ON 2L NC, RR 16, TEMP 98.0. IVF INFUSING PER 
ORDERS. PT WAS CLEANED AND REPOSITION FOR COMFORT. BED ALARM ON.

## 2019-03-18 VITALS — DIASTOLIC BLOOD PRESSURE: 44 MMHG | SYSTOLIC BLOOD PRESSURE: 136 MMHG

## 2019-03-18 VITALS — DIASTOLIC BLOOD PRESSURE: 41 MMHG | SYSTOLIC BLOOD PRESSURE: 139 MMHG

## 2019-03-18 VITALS — SYSTOLIC BLOOD PRESSURE: 134 MMHG | DIASTOLIC BLOOD PRESSURE: 51 MMHG

## 2019-03-18 VITALS — SYSTOLIC BLOOD PRESSURE: 125 MMHG | DIASTOLIC BLOOD PRESSURE: 77 MMHG

## 2019-03-18 VITALS — SYSTOLIC BLOOD PRESSURE: 127 MMHG | DIASTOLIC BLOOD PRESSURE: 45 MMHG

## 2019-03-18 LAB
ANION GAP SERPL CALCULATED.3IONS-SCNC: 16.9 MMOL/L (ref 8–16)
BASOPHILS # BLD AUTO: 0.1 K/UL (ref 0–0.22)
BASOPHILS NFR BLD AUTO: 1.2 % (ref 0–2)
BUN SERPL-MCNC: 70 MG/DL (ref 7–18)
CHLORIDE SERPL-SCNC: 102 MMOL/L (ref 98–107)
CO2 SERPL-SCNC: 25 MMOL/L (ref 21–32)
CREAT SERPL-MCNC: 7 MG/DL (ref 0.6–1.3)
EOSINOPHIL # BLD AUTO: 0.5 K/UL (ref 0–0.4)
EOSINOPHIL NFR BLD AUTO: 5.7 % (ref 0–4)
ERYTHROCYTE [DISTWIDTH] IN BLOOD BY AUTOMATED COUNT: 16.1 % (ref 11.6–13.7)
GFR SERPL CREATININE-BSD FRML MDRD: 8 ML/MIN (ref 90–?)
GLUCOSE SERPL-MCNC: 78 MG/DL (ref 74–106)
HCT VFR BLD AUTO: 34.5 % (ref 36–48)
HGB BLD-MCNC: 11 G/DL (ref 12–16)
LYMPHOCYTES # BLD AUTO: 1.4 K/UL (ref 2.5–16.5)
LYMPHOCYTES NFR BLD AUTO: 15.8 % (ref 20.5–51.1)
MCH RBC QN AUTO: 27 PG (ref 27–31)
MCHC RBC AUTO-ENTMCNC: 32 G/DL (ref 33–37)
MCV RBC AUTO: 85.2 FL (ref 80–94)
MONOCYTES # BLD AUTO: 0.8 K/UL (ref 0.8–1)
MONOCYTES NFR BLD AUTO: 8.7 % (ref 1.7–9.3)
NEUTROPHILS # BLD AUTO: 6 K/UL (ref 1.8–7.7)
NEUTROPHILS NFR BLD AUTO: 68.6 % (ref 42.2–75.2)
PLATELET # BLD AUTO: 196 K/UL (ref 140–450)
POTASSIUM SERPL-SCNC: 3.9 MMOL/L (ref 3.5–5.1)
RBC # BLD AUTO: 4.05 MIL/UL (ref 4.2–5.4)
SODIUM SERPL-SCNC: 140 MMOL/L (ref 136–145)
WBC # BLD AUTO: 8.8 K/UL (ref 4.8–10.8)

## 2019-03-18 PROCEDURE — 5A1D70Z PERFORMANCE OF URINARY FILTRATION, INTERMITTENT, LESS THAN 6 HOURS PER DAY: ICD-10-PCS

## 2019-03-18 RX ADMIN — IPRATROPIUM BROMIDE AND ALBUTEROL SULFATE SCH ML: .5; 3 SOLUTION RESPIRATORY (INHALATION) at 13:00

## 2019-03-18 RX ADMIN — SEVELAMER CARBONATE SCH MG: 800 TABLET, FILM COATED ORAL at 10:33

## 2019-03-18 RX ADMIN — IBUPROFEN PRN MG: 600 TABLET ORAL at 17:07

## 2019-03-18 RX ADMIN — Medication SCH DEV: at 04:53

## 2019-03-18 RX ADMIN — SEVELAMER CARBONATE SCH MG: 800 TABLET, FILM COATED ORAL at 12:18

## 2019-03-18 RX ADMIN — IPRATROPIUM BROMIDE AND ALBUTEROL SULFATE SCH ML: .5; 3 SOLUTION RESPIRATORY (INHALATION) at 19:12

## 2019-03-18 RX ADMIN — INSULIN GLARGINE SCH UNITS: 100 INJECTION, SOLUTION SUBCUTANEOUS at 09:00

## 2019-03-18 RX ADMIN — Medication SCH MG: at 10:34

## 2019-03-18 RX ADMIN — Medication SCH DEV: at 11:30

## 2019-03-18 RX ADMIN — FAMOTIDINE SCH MG: 20 TABLET ORAL at 10:34

## 2019-03-18 RX ADMIN — IPRATROPIUM BROMIDE AND ALBUTEROL SULFATE SCH ML: .5; 3 SOLUTION RESPIRATORY (INHALATION) at 07:27

## 2019-03-18 RX ADMIN — PIPERACILLIN SODIUM AND TAZOBACTAM SODIUM SCH MLS/HR: 2; .25 INJECTION, SOLUTION INTRAVENOUS at 10:34

## 2019-03-18 RX ADMIN — SEVELAMER CARBONATE SCH MG: 800 TABLET, FILM COATED ORAL at 17:07

## 2019-03-18 RX ADMIN — LEVETIRACETAM SCH MG: 100 SOLUTION ORAL at 10:33

## 2019-03-18 RX ADMIN — Medication SCH DEV: at 17:21

## 2019-03-18 RX ADMIN — SODIUM CHLORIDE SCH MLS/HR: 9 INJECTION, SOLUTION INTRAVENOUS at 04:19

## 2019-03-18 RX ADMIN — Medication SCH EACH: at 10:34

## 2019-03-18 NOTE — NUR
CHECKED ON THE PATIENT. WITH DIALYSIS NURSE. PT GETTING DIALYSIS AT THIS TIME. WILL CONTINUE 
TO MONITOR PT.

## 2019-03-18 NOTE — NUR
ENDORSED PT TO PN NURSE FOR DISCHARGE. PT TO GO BACK TO [facility]. NURSE NARINDER GIVEN 
REPORT. DAUGHTER AWARE OF HER MOM BEING TRANSFERRED TO [facility]. PT STABLE AT THIS 
TIME. PT TO BE PICKED UP [facility] AT 2000.

## 2019-03-18 NOTE — NUR
ADMINISTERED MEDS TO PT AS ORDERED. CRUSHED AND MIXED WITH APPLE SAUCE.TOLERATED WELL. BS 
91, LANTUS PUT ON HOLD. PT CRIES OFTEN, NO BED WET. PT SLEEPING COMFORTABLY AT THIS TIME. 
WILL ASSESS PT FREQUENTLY. NO FACIAL GRIMACE. NO DISTRESS NOTED. ALL SAFETY MEASURE IN 
PLACE. WILL CONTINUE TO MONITOR PT.

## 2019-03-18 NOTE — NUR
PT DONE WITH HD,800 ML OUTPUT. /51, HR 67. PER DR RAMON, MONITOR PT FOR NEXT 1 TO 1.5 
HR. IF PT HAS STABLE BLOOD PRESSURE, CAN BE DISCHARGED. IF NOT THEN PT TO BE MONITORED 
CLOSELY . MIGHT GO TO [facility] TOMORROW IF BP IS NOT STABLE.

## 2019-03-18 NOTE — NUR
DC IV ACCESS, DC TELEMETRY. DC BACK TO [facility] TO 6A PICKED UP BY [facility] 
[facility]. ALL PAPERWORK UNABLE TO SIGN BY PATIENT.

## 2019-03-18 NOTE — NUR
CM NOTE



PER LISA FRANCO [facility], WHEN PATIENT IS READY FOR DISCHARGE, THE PATIENT CAN GO TO 
 6 A UNDER DR. STATON, NUMBER TO CALL FOR REPORT PH# [phone].  CHARGE NURSE HUBERT BAE.

## 2019-05-25 ENCOUNTER — HOSPITAL ENCOUNTER (INPATIENT)
Dept: HOSPITAL 26 - MED | Age: 59
LOS: 7 days | Discharge: NURSING, REHAB OR LONG-TERM CARE | DRG: 853 | End: 2019-06-01
Attending: FAMILY MEDICINE | Admitting: FAMILY MEDICINE
Payer: COMMERCIAL

## 2019-05-25 VITALS — DIASTOLIC BLOOD PRESSURE: 79 MMHG | SYSTOLIC BLOOD PRESSURE: 99 MMHG

## 2019-05-25 VITALS — HEIGHT: 62 IN | BODY MASS INDEX: 34.23 KG/M2 | WEIGHT: 186 LBS

## 2019-05-25 VITALS — SYSTOLIC BLOOD PRESSURE: 87 MMHG | DIASTOLIC BLOOD PRESSURE: 30 MMHG

## 2019-05-25 DIAGNOSIS — E83.41: ICD-10-CM

## 2019-05-25 DIAGNOSIS — D68.4: ICD-10-CM

## 2019-05-25 DIAGNOSIS — K21.9: ICD-10-CM

## 2019-05-25 DIAGNOSIS — E11.52: ICD-10-CM

## 2019-05-25 DIAGNOSIS — E11.42: ICD-10-CM

## 2019-05-25 DIAGNOSIS — E78.00: ICD-10-CM

## 2019-05-25 DIAGNOSIS — I13.2: ICD-10-CM

## 2019-05-25 DIAGNOSIS — I45.10: ICD-10-CM

## 2019-05-25 DIAGNOSIS — E43: ICD-10-CM

## 2019-05-25 DIAGNOSIS — E78.5: ICD-10-CM

## 2019-05-25 DIAGNOSIS — J44.9: ICD-10-CM

## 2019-05-25 DIAGNOSIS — I50.43: ICD-10-CM

## 2019-05-25 DIAGNOSIS — R62.7: ICD-10-CM

## 2019-05-25 DIAGNOSIS — A41.9: Primary | ICD-10-CM

## 2019-05-25 DIAGNOSIS — G93.41: ICD-10-CM

## 2019-05-25 DIAGNOSIS — J32.0: ICD-10-CM

## 2019-05-25 DIAGNOSIS — R47.01: ICD-10-CM

## 2019-05-25 DIAGNOSIS — N17.0: ICD-10-CM

## 2019-05-25 DIAGNOSIS — Z74.01: ICD-10-CM

## 2019-05-25 DIAGNOSIS — Z83.3: ICD-10-CM

## 2019-05-25 DIAGNOSIS — F32.9: ICD-10-CM

## 2019-05-25 DIAGNOSIS — R13.10: ICD-10-CM

## 2019-05-25 DIAGNOSIS — K12.2: ICD-10-CM

## 2019-05-25 DIAGNOSIS — L02.413: ICD-10-CM

## 2019-05-25 DIAGNOSIS — E87.8: ICD-10-CM

## 2019-05-25 DIAGNOSIS — E83.59: ICD-10-CM

## 2019-05-25 DIAGNOSIS — I35.0: ICD-10-CM

## 2019-05-25 DIAGNOSIS — Z88.5: ICD-10-CM

## 2019-05-25 DIAGNOSIS — Z99.2: ICD-10-CM

## 2019-05-25 DIAGNOSIS — E87.2: ICD-10-CM

## 2019-05-25 DIAGNOSIS — E11.621: ICD-10-CM

## 2019-05-25 DIAGNOSIS — I50.9: ICD-10-CM

## 2019-05-25 DIAGNOSIS — L02.11: ICD-10-CM

## 2019-05-25 DIAGNOSIS — N18.6: ICD-10-CM

## 2019-05-25 DIAGNOSIS — F41.9: ICD-10-CM

## 2019-05-25 DIAGNOSIS — L72.3: ICD-10-CM

## 2019-05-25 DIAGNOSIS — E11.22: ICD-10-CM

## 2019-05-25 DIAGNOSIS — I42.9: ICD-10-CM

## 2019-05-25 DIAGNOSIS — R65.21: ICD-10-CM

## 2019-05-25 DIAGNOSIS — I27.21: ICD-10-CM

## 2019-05-25 DIAGNOSIS — L97.519: ICD-10-CM

## 2019-05-25 DIAGNOSIS — I21.A1: ICD-10-CM

## 2019-05-25 DIAGNOSIS — E11.21: ICD-10-CM

## 2019-05-25 DIAGNOSIS — D64.9: ICD-10-CM

## 2019-05-25 DIAGNOSIS — I34.0: ICD-10-CM

## 2019-05-25 DIAGNOSIS — K80.20: ICD-10-CM

## 2019-05-25 DIAGNOSIS — Z90.710: ICD-10-CM

## 2019-05-25 DIAGNOSIS — I69.351: ICD-10-CM

## 2019-05-25 DIAGNOSIS — I25.10: ICD-10-CM

## 2019-05-25 DIAGNOSIS — Z87.891: ICD-10-CM

## 2019-05-25 DIAGNOSIS — G40.909: ICD-10-CM

## 2019-05-25 DIAGNOSIS — N39.0: ICD-10-CM

## 2019-05-25 DIAGNOSIS — Z93.1: ICD-10-CM

## 2019-05-25 DIAGNOSIS — Z22.322: ICD-10-CM

## 2019-05-25 LAB
ALBUMIN FLD-MCNC: 2.4 G/DL (ref 3.4–5)
AMYLASE SERPL-CCNC: 45 U/L (ref 25–115)
ANION GAP SERPL CALCULATED.3IONS-SCNC: 12.8 MMOL/L (ref 8–16)
APPEARANCE UR: (no result)
AST SERPL-CCNC: 255 U/L (ref 15–37)
BARBITURATES UR QL SCN: (no result) NG/ML
BASOPHILS # BLD AUTO: 0.1 K/UL (ref 0–0.22)
BASOPHILS NFR BLD AUTO: 0.6 % (ref 0–2)
BENZODIAZ UR QL SCN: (no result) NG/ML
BILIRUB SERPL-MCNC: 0.4 MG/DL (ref 0–1)
BILIRUB UR QL STRIP: (no result)
BUN SERPL-MCNC: 53 MG/DL (ref 7–18)
BZE UR QL SCN: (no result) NG/ML
CANNABINOIDS UR QL SCN: (no result) NG/ML
CHLORIDE SERPL-SCNC: 97 MMOL/L (ref 98–107)
CO2 SERPL-SCNC: 32.1 MMOL/L (ref 21–32)
COLOR UR: (no result)
CREAT SERPL-MCNC: 5 MG/DL (ref 0.6–1.3)
EOSINOPHIL # BLD AUTO: 0 K/UL (ref 0–0.4)
EOSINOPHIL NFR BLD AUTO: 0.1 % (ref 0–4)
ERYTHROCYTE [DISTWIDTH] IN BLOOD BY AUTOMATED COUNT: 17.3 % (ref 11.6–13.7)
GFR SERPL CREATININE-BSD FRML MDRD: 11 ML/MIN (ref 90–?)
GLUCOSE SERPL-MCNC: 199 MG/DL (ref 74–106)
GLUCOSE UR STRIP-MCNC: (no result) MG/DL
HCT VFR BLD AUTO: 36.1 % (ref 36–48)
HGB BLD-MCNC: 11.1 G/DL (ref 12–16)
HGB UR QL STRIP: (no result)
LEUKOCYTE ESTERASE UR QL STRIP: (no result)
LIPASE SERPL-CCNC: 143 U/L (ref 73–393)
LYMPHOCYTES # BLD AUTO: 1.2 K/UL (ref 2.5–16.5)
LYMPHOCYTES NFR BLD AUTO: 7.7 % (ref 20.5–51.1)
MAGNESIUM SERPL-MCNC: 2.6 MG/DL (ref 1.8–2.4)
MCH RBC QN AUTO: 27 PG (ref 27–31)
MCHC RBC AUTO-ENTMCNC: 31 G/DL (ref 33–37)
MCV RBC AUTO: 87.9 FL (ref 80–94)
MONOCYTES # BLD AUTO: 1.2 K/UL (ref 0.8–1)
MONOCYTES NFR BLD AUTO: 7.9 % (ref 1.7–9.3)
NEUTROPHILS # BLD AUTO: 13.1 K/UL (ref 1.8–7.7)
NEUTROPHILS NFR BLD AUTO: 83.7 % (ref 42.2–75.2)
NITRITE UR QL STRIP: POSITIVE
OPIATES UR QL SCN: (no result) NG/ML
PCP UR QL SCN: (no result) NG/ML
PH UR STRIP: 7 [PH] (ref 5–9)
PHOSPHATE SERPL-MCNC: 4.7 MG/DL (ref 2.5–4.9)
PLATELET # BLD AUTO: 327 K/UL (ref 140–450)
POTASSIUM SERPL-SCNC: 4.9 MMOL/L (ref 3.5–5.1)
PROTHROMBIN TIME: 19.5 SECS (ref 10.8–13.4)
RBC # BLD AUTO: 4.11 MIL/UL (ref 4.2–5.4)
RBC #/AREA URNS HPF: (no result) /HPF (ref 0–5)
SODIUM SERPL-SCNC: 137 MMOL/L (ref 136–145)
TSH SERPL DL<=0.05 MIU/L-ACNC: 1.88 UIU/ML (ref 0.34–3.74)
WBC # BLD AUTO: 15.7 K/UL (ref 4.8–10.8)
WBC,URINE: (no result) /HPF (ref 0–5)

## 2019-05-25 PROCEDURE — C1758 CATHETER, URETERAL: HCPCS

## 2019-05-25 PROCEDURE — 5A1D70Z PERFORMANCE OF URINARY FILTRATION, INTERMITTENT, LESS THAN 6 HOURS PER DAY: ICD-10-PCS | Performed by: FAMILY MEDICINE

## 2019-05-25 NOTE — NUR
Patient will be admitted to care of Dr. Smith. Admited to Tele. Patient went to 
[id number] via [facility] by RN and EMT. Belongings list completed.  Report to 
MIRACLE Garcia.

## 2019-05-25 NOTE — NUR
PT BIBA C/O ALOC. PT PRESENTS TO THE ED OPENING AND CLOSING EYES, RESPONDING TO 
VOICE AND PAINFUL STIMULI; NON-VERBAL, W/ MOANING AND FACIAL GRIMACE. SKIN 
WARM, DRY AN INTACT; L AV FISTULA PRESENT. BREATHING EQUAL AND UNLABORED. LEFT 
HAND FIRST FINGER NICROSIS. PT PLACED ON BEDSIDE CARDIAC MONITOR. SAFETY 
PRECAUTIONS IN PLACE, SEIZURE PADS PLACED ON BED. PENDING ER MD BARRON. 



PMH: DIALYSIS, DIABETES, HTN, APHASIA, CVA, SEIZURES, HYPERLIPIDEMIA, 

RX: SEE LIST

-------------------------------------------------------------------------------

Addendum: 05/25/19 at 2158 by [email or web address]

-------------------------------------------------------------------------------

PT BIBA C/O ALOC. PT PRESENTS TO THE ED OPENING AND CLOSING EYES, RESPONDING TO 
VOICE AND PAINFUL STIMULI; NON-VERBAL, W/ MOANING AND FACIAL GRIMACE. SKIN 
WARM, DRY, RIGHT SHOULDER DRAINING WOUND; L AV FISTULA PRESENT. BREATHING EQUAL 
AND UNLABORED. LEFT HAND FIRST FINGER NICROSIS. PT PLACED ON BEDSIDE CARDIAC 
MONITOR. SAFETY PRECAUTIONS IN PLACE, SEIZURE PADS PLACED ON BED. PENDING ER MD BARRON. 



PMH: DIALYSIS (M,W,F), DIABETES, HTN, APHASIA, CVA, SEIZURES, HYPERLIPIDEMIA, 

RX: SEE LIST

## 2019-05-25 NOTE — NUR
-------------------------------------------------------------------------------

            *** Note undone in EDM - 05/25/19 at 2116 by [email or web address] ***            

-------------------------------------------------------------------------------

PT BIBA C/O ALOC. PT PRESENTS TO THE ED OPENING AND CLOSING EYES, RESPONDING TO 
VOICE AND PAINFUL STIMULI; NON-VERBAL, W/ MOANING AND FACIAL GRIMACE. SKIN 
WARM, DRY AN INTACT; L AV FISTULA PRESENT. BREATHING EQUAL AND UNLABORED. LEFT 
HAND FIRST FINGER NICROSIS. PT PLACED ON BEDSIDE CARDIAC MONITOR. SAFETY 
PRECAUTIONS IN PLACE, SEIZURE PADS PLACED ON BED. PENDING ER MD BARRON. 



PMH: DIALYSIS, DIABETES, HTN, APHASIA, CVA, SEIZURES, HYPERLIPIDEMIA, 

RX: SEE LIST

## 2019-05-25 NOTE — NUR
PATIENT ARRIVED FROM ER VIA GURNEY, ACCOMPANIED BY . PATIENT IS CONFUSED AT BASELINE, 
PATIENT APPEARED AGITATED. PATIENT IS SATURATING WELL ON RA, VITALS SIGNS STABLE BLOOD 
PRESSURE IS LOW NORMAL. PATIENT HAS MULTIPLE SCAB WOUNDS FROM HEAD TO TOE AND IS ATTEMPTING 
TO SCRATCH THEM. PATIENT HAS LARGE SWOLLEN ABSCESS ON RIGHT SHOULDER AND SMALLER ABSCESS ON 
LEFT CHIN. AV FISTULA ON UPPER LEFT ARM HAS WEAK BRUIT AND THRILL. IV ACCESS IS A 20 G 
CATHETER IN THE RIGHT AC. IV SITE IS PATENT AND WRAPPED WITH GAUZE FOR PROTECTION AS PATIENT 
IS ACTIVELY TRYING TO PULL IT OUT. WILL TRY TO DISTRACT PATIENT TO PREVENT HARM TO SELF.

## 2019-05-25 NOTE — NUR
#14 FR Urinary catheter inserted utilizing sterile technique. Immediate return 
of thick, brown urine noted. Catheter removed tip intact. Urine sample 
collected and sent to lab. Pt tolerated procedure well.

## 2019-05-26 VITALS — SYSTOLIC BLOOD PRESSURE: 124 MMHG | DIASTOLIC BLOOD PRESSURE: 94 MMHG

## 2019-05-26 VITALS — SYSTOLIC BLOOD PRESSURE: 126 MMHG | DIASTOLIC BLOOD PRESSURE: 79 MMHG

## 2019-05-26 VITALS — DIASTOLIC BLOOD PRESSURE: 40 MMHG | SYSTOLIC BLOOD PRESSURE: 80 MMHG

## 2019-05-26 VITALS — DIASTOLIC BLOOD PRESSURE: 75 MMHG | SYSTOLIC BLOOD PRESSURE: 96 MMHG

## 2019-05-26 VITALS — SYSTOLIC BLOOD PRESSURE: 121 MMHG | DIASTOLIC BLOOD PRESSURE: 75 MMHG

## 2019-05-26 VITALS — DIASTOLIC BLOOD PRESSURE: 39 MMHG | SYSTOLIC BLOOD PRESSURE: 117 MMHG

## 2019-05-26 VITALS — SYSTOLIC BLOOD PRESSURE: 114 MMHG | DIASTOLIC BLOOD PRESSURE: 50 MMHG

## 2019-05-26 VITALS — SYSTOLIC BLOOD PRESSURE: 98 MMHG | DIASTOLIC BLOOD PRESSURE: 37 MMHG

## 2019-05-26 VITALS — SYSTOLIC BLOOD PRESSURE: 113 MMHG | DIASTOLIC BLOOD PRESSURE: 60 MMHG

## 2019-05-26 VITALS — SYSTOLIC BLOOD PRESSURE: 106 MMHG | DIASTOLIC BLOOD PRESSURE: 45 MMHG

## 2019-05-26 VITALS — DIASTOLIC BLOOD PRESSURE: 50 MMHG | SYSTOLIC BLOOD PRESSURE: 94 MMHG

## 2019-05-26 VITALS — SYSTOLIC BLOOD PRESSURE: 94 MMHG | DIASTOLIC BLOOD PRESSURE: 45 MMHG

## 2019-05-26 VITALS — DIASTOLIC BLOOD PRESSURE: 104 MMHG | SYSTOLIC BLOOD PRESSURE: 120 MMHG

## 2019-05-26 VITALS — DIASTOLIC BLOOD PRESSURE: 74 MMHG | SYSTOLIC BLOOD PRESSURE: 127 MMHG

## 2019-05-26 VITALS — DIASTOLIC BLOOD PRESSURE: 75 MMHG | SYSTOLIC BLOOD PRESSURE: 127 MMHG

## 2019-05-26 VITALS — SYSTOLIC BLOOD PRESSURE: 102 MMHG | DIASTOLIC BLOOD PRESSURE: 66 MMHG

## 2019-05-26 VITALS — SYSTOLIC BLOOD PRESSURE: 116 MMHG | DIASTOLIC BLOOD PRESSURE: 36 MMHG

## 2019-05-26 VITALS — DIASTOLIC BLOOD PRESSURE: 40 MMHG | SYSTOLIC BLOOD PRESSURE: 63 MMHG

## 2019-05-26 VITALS — DIASTOLIC BLOOD PRESSURE: 76 MMHG | SYSTOLIC BLOOD PRESSURE: 101 MMHG

## 2019-05-26 VITALS — DIASTOLIC BLOOD PRESSURE: 49 MMHG | SYSTOLIC BLOOD PRESSURE: 132 MMHG

## 2019-05-26 VITALS — DIASTOLIC BLOOD PRESSURE: 54 MMHG | SYSTOLIC BLOOD PRESSURE: 116 MMHG

## 2019-05-26 VITALS — DIASTOLIC BLOOD PRESSURE: 69 MMHG | SYSTOLIC BLOOD PRESSURE: 114 MMHG

## 2019-05-26 VITALS — SYSTOLIC BLOOD PRESSURE: 115 MMHG | DIASTOLIC BLOOD PRESSURE: 81 MMHG

## 2019-05-26 VITALS — SYSTOLIC BLOOD PRESSURE: 125 MMHG | DIASTOLIC BLOOD PRESSURE: 47 MMHG

## 2019-05-26 VITALS — SYSTOLIC BLOOD PRESSURE: 82 MMHG | DIASTOLIC BLOOD PRESSURE: 30 MMHG

## 2019-05-26 VITALS — SYSTOLIC BLOOD PRESSURE: 120 MMHG | DIASTOLIC BLOOD PRESSURE: 39 MMHG

## 2019-05-26 VITALS — DIASTOLIC BLOOD PRESSURE: 59 MMHG | SYSTOLIC BLOOD PRESSURE: 164 MMHG

## 2019-05-26 VITALS — DIASTOLIC BLOOD PRESSURE: 63 MMHG | SYSTOLIC BLOOD PRESSURE: 107 MMHG

## 2019-05-26 VITALS — DIASTOLIC BLOOD PRESSURE: 72 MMHG | SYSTOLIC BLOOD PRESSURE: 113 MMHG

## 2019-05-26 VITALS — SYSTOLIC BLOOD PRESSURE: 123 MMHG | DIASTOLIC BLOOD PRESSURE: 43 MMHG

## 2019-05-26 VITALS — SYSTOLIC BLOOD PRESSURE: 127 MMHG | DIASTOLIC BLOOD PRESSURE: 100 MMHG

## 2019-05-26 VITALS — DIASTOLIC BLOOD PRESSURE: 35 MMHG | SYSTOLIC BLOOD PRESSURE: 83 MMHG

## 2019-05-26 VITALS — SYSTOLIC BLOOD PRESSURE: 104 MMHG | DIASTOLIC BLOOD PRESSURE: 41 MMHG

## 2019-05-26 VITALS — SYSTOLIC BLOOD PRESSURE: 119 MMHG | DIASTOLIC BLOOD PRESSURE: 65 MMHG

## 2019-05-26 VITALS — SYSTOLIC BLOOD PRESSURE: 114 MMHG | DIASTOLIC BLOOD PRESSURE: 75 MMHG

## 2019-05-26 VITALS — SYSTOLIC BLOOD PRESSURE: 112 MMHG | DIASTOLIC BLOOD PRESSURE: 50 MMHG

## 2019-05-26 VITALS — SYSTOLIC BLOOD PRESSURE: 109 MMHG | DIASTOLIC BLOOD PRESSURE: 52 MMHG

## 2019-05-26 VITALS — DIASTOLIC BLOOD PRESSURE: 72 MMHG | SYSTOLIC BLOOD PRESSURE: 98 MMHG

## 2019-05-26 VITALS — DIASTOLIC BLOOD PRESSURE: 76 MMHG | SYSTOLIC BLOOD PRESSURE: 129 MMHG

## 2019-05-26 VITALS — SYSTOLIC BLOOD PRESSURE: 92 MMHG | DIASTOLIC BLOOD PRESSURE: 73 MMHG

## 2019-05-26 VITALS — DIASTOLIC BLOOD PRESSURE: 92 MMHG | SYSTOLIC BLOOD PRESSURE: 123 MMHG

## 2019-05-26 VITALS — SYSTOLIC BLOOD PRESSURE: 80 MMHG | DIASTOLIC BLOOD PRESSURE: 63 MMHG

## 2019-05-26 VITALS — SYSTOLIC BLOOD PRESSURE: 98 MMHG | DIASTOLIC BLOOD PRESSURE: 65 MMHG

## 2019-05-26 VITALS — DIASTOLIC BLOOD PRESSURE: 70 MMHG | SYSTOLIC BLOOD PRESSURE: 101 MMHG

## 2019-05-26 VITALS — DIASTOLIC BLOOD PRESSURE: 39 MMHG | SYSTOLIC BLOOD PRESSURE: 67 MMHG

## 2019-05-26 VITALS — DIASTOLIC BLOOD PRESSURE: 54 MMHG | SYSTOLIC BLOOD PRESSURE: 114 MMHG

## 2019-05-26 VITALS — DIASTOLIC BLOOD PRESSURE: 57 MMHG | SYSTOLIC BLOOD PRESSURE: 90 MMHG

## 2019-05-26 VITALS — DIASTOLIC BLOOD PRESSURE: 59 MMHG | SYSTOLIC BLOOD PRESSURE: 109 MMHG

## 2019-05-26 VITALS — SYSTOLIC BLOOD PRESSURE: 89 MMHG | DIASTOLIC BLOOD PRESSURE: 26 MMHG

## 2019-05-26 VITALS — SYSTOLIC BLOOD PRESSURE: 117 MMHG | DIASTOLIC BLOOD PRESSURE: 50 MMHG

## 2019-05-26 VITALS — DIASTOLIC BLOOD PRESSURE: 52 MMHG | SYSTOLIC BLOOD PRESSURE: 104 MMHG

## 2019-05-26 VITALS — SYSTOLIC BLOOD PRESSURE: 64 MMHG | DIASTOLIC BLOOD PRESSURE: 37 MMHG

## 2019-05-26 LAB
ANION GAP SERPL CALCULATED.3IONS-SCNC: 15 MMOL/L (ref 8–16)
BASOPHILS # BLD AUTO: 0.1 K/UL (ref 0–0.22)
BASOPHILS NFR BLD AUTO: 0.5 % (ref 0–2)
BUN SERPL-MCNC: 65 MG/DL (ref 7–18)
CHLORIDE SERPL-SCNC: 97 MMOL/L (ref 98–107)
CHOLEST/HDLC SERPL: 2.9 {RATIO} (ref 1–4.5)
CO2 SERPL-SCNC: 28.2 MMOL/L (ref 21–32)
CREAT SERPL-MCNC: 5.5 MG/DL (ref 0.6–1.3)
EOSINOPHIL # BLD AUTO: 0.1 K/UL (ref 0–0.4)
EOSINOPHIL NFR BLD AUTO: 0.7 % (ref 0–4)
ERYTHROCYTE [DISTWIDTH] IN BLOOD BY AUTOMATED COUNT: 17.6 % (ref 11.6–13.7)
GFR SERPL CREATININE-BSD FRML MDRD: 10 ML/MIN (ref 90–?)
GLUCOSE SERPL-MCNC: 242 MG/DL (ref 74–106)
HCT VFR BLD AUTO: 37.6 % (ref 36–48)
HDLC SERPL-MCNC: 23 MG/DL (ref 40–60)
HGB BLD-MCNC: 11.4 G/DL (ref 12–16)
LDLC SERPL CALC-MCNC: 24 MG/DL (ref 60–100)
LYMPHOCYTES # BLD AUTO: 1.6 K/UL (ref 2.5–16.5)
LYMPHOCYTES NFR BLD AUTO: 8 % (ref 20.5–51.1)
MCH RBC QN AUTO: 27 PG (ref 27–31)
MCHC RBC AUTO-ENTMCNC: 31 G/DL (ref 33–37)
MCV RBC AUTO: 87.8 FL (ref 80–94)
MONOCYTES # BLD AUTO: 1.4 K/UL (ref 0.8–1)
MONOCYTES NFR BLD AUTO: 6.9 % (ref 1.7–9.3)
NEUTROPHILS # BLD AUTO: 16.7 K/UL (ref 1.8–7.7)
NEUTROPHILS NFR BLD AUTO: 83.9 % (ref 42.2–75.2)
PLATELET # BLD AUTO: 423 K/UL (ref 140–450)
POTASSIUM SERPL-SCNC: 5.2 MMOL/L (ref 3.5–5.1)
RBC # BLD AUTO: 4.28 MIL/UL (ref 4.2–5.4)
SODIUM SERPL-SCNC: 135 MMOL/L (ref 136–145)
TRIGL SERPL-MCNC: 95 MG/DL (ref 30–150)
WBC # BLD AUTO: 20 K/UL (ref 4.8–10.8)

## 2019-05-26 PROCEDURE — B548ZZA ULTRASONOGRAPHY OF SUPERIOR VENA CAVA, GUIDANCE: ICD-10-PCS

## 2019-05-26 PROCEDURE — 02HV33Z INSERTION OF INFUSION DEVICE INTO SUPERIOR VENA CAVA, PERCUTANEOUS APPROACH: ICD-10-PCS

## 2019-05-26 RX ADMIN — Medication SCH ML: at 17:39

## 2019-05-26 RX ADMIN — INSULIN LISPRO PRN UNITS: 100 INJECTION, SOLUTION INTRAVENOUS; SUBCUTANEOUS at 22:18

## 2019-05-26 RX ADMIN — MUPIROCIN SCH GM: 20 OINTMENT TOPICAL at 21:26

## 2019-05-26 RX ADMIN — INSULIN LISPRO SCH UNITS: 100 INJECTION, SOLUTION INTRAVENOUS; SUBCUTANEOUS at 11:30

## 2019-05-26 RX ADMIN — Medication SCH DEV: at 07:13

## 2019-05-26 RX ADMIN — Medication SCH ML: at 09:00

## 2019-05-26 RX ADMIN — Medication SCH ML: at 21:24

## 2019-05-26 RX ADMIN — Medication SCH DEV: at 21:00

## 2019-05-26 RX ADMIN — Medication SCH ML: at 13:00

## 2019-05-26 RX ADMIN — INSULIN LISPRO PRN UNITS: 100 INJECTION, SOLUTION INTRAVENOUS; SUBCUTANEOUS at 12:00

## 2019-05-26 RX ADMIN — DEXTROSE SCH MLS/HR: 50 INJECTION, SOLUTION INTRAVENOUS at 21:22

## 2019-05-26 RX ADMIN — SEVELAMER CARBONATE SCH MG: 800 TABLET, FILM COATED ORAL at 12:00

## 2019-05-26 RX ADMIN — Medication SCH MG: at 10:54

## 2019-05-26 RX ADMIN — LORATADINE PRN MG: 10 TABLET ORAL at 23:35

## 2019-05-26 RX ADMIN — PIPERACILLIN SODIUM AND TAZOBACTAM SODIUM SCH MLS/HR: 2; .25 INJECTION, SOLUTION INTRAVENOUS at 20:13

## 2019-05-26 RX ADMIN — MUPIROCIN SCH GM: 20 OINTMENT TOPICAL at 09:00

## 2019-05-26 RX ADMIN — DEXTROSE SCH MLS/HR: 50 INJECTION, SOLUTION INTRAVENOUS at 10:15

## 2019-05-26 RX ADMIN — INSULIN LISPRO SCH UNITS: 100 INJECTION, SOLUTION INTRAVENOUS; SUBCUTANEOUS at 16:10

## 2019-05-26 RX ADMIN — Medication SCH TAB: at 21:24

## 2019-05-26 RX ADMIN — Medication SCH DEV: at 11:53

## 2019-05-26 RX ADMIN — FAMOTIDINE SCH MG: 20 TABLET ORAL at 10:54

## 2019-05-26 RX ADMIN — NOREPINEPHRINE BITARTRATE PRN MLS/HR: 1 INJECTION INTRAVENOUS at 11:18

## 2019-05-26 RX ADMIN — MIRTAZAPINE SCH MG: 15 TABLET, FILM COATED ORAL at 21:26

## 2019-05-26 RX ADMIN — SODIUM CHLORIDE SCH MLS/HR: 9 INJECTION, SOLUTION INTRAVENOUS at 18:52

## 2019-05-26 RX ADMIN — SEVELAMER CARBONATE SCH MG: 800 TABLET, FILM COATED ORAL at 16:00

## 2019-05-26 RX ADMIN — Medication SCH MG: at 11:30

## 2019-05-26 RX ADMIN — Medication SCH MG: at 07:30

## 2019-05-26 RX ADMIN — Medication SCH MG: at 16:00

## 2019-05-26 RX ADMIN — PIPERACILLIN SODIUM AND TAZOBACTAM SODIUM SCH MLS/HR: 2; .25 INJECTION, SOLUTION INTRAVENOUS at 12:56

## 2019-05-26 RX ADMIN — SEVELAMER CARBONATE SCH MG: 800 TABLET, FILM COATED ORAL at 10:53

## 2019-05-26 RX ADMIN — Medication SCH DEV: at 16:08

## 2019-05-26 RX ADMIN — INSULIN LISPRO SCH UNITS: 100 INJECTION, SOLUTION INTRAVENOUS; SUBCUTANEOUS at 07:18

## 2019-05-26 NOTE — NUR
DR. AGUILAR AND DR. ORDAZ AT BEDSIDE TO CHECK PT. BOTH OF THEM WERE AWARE THAT PT IS UNABLE TO 
WAKE UP BY VERBAL STIMULI FOR PO MEDS, DAUGHTER AT BEDSIDE. PT'S DAUGHTER  STATED SHE CAN 
NOT WAKE UP PT. BEDSIDE MONITOR SHOWS SR WITH BBB. BP IN NORMAL RANGE WITH LEVOPHED DRIP. 
WILL CONTINUE TO MONITOR.

## 2019-05-26 NOTE — NUR
DR FONSECA AND DR ALICIA AT BED SIDE, UPDATED MD ON PT CONDITION. NO NEW ORDERS. WILL 
CONTINUE TO MONITOR PT

## 2019-05-26 NOTE — NUR
CHARGE NURSE NOTIFIED DR. ORDAZ PT'S BP 64/63 AFTER RECYCLING BP. HR 61, RR 20, O2 SATS 98% 
AT THIS TIME. STILL DOES NOT OPEN EYES.

## 2019-05-26 NOTE — NUR
RECEIVED PT FROM PM NURSE RACHEL, PT DOES NOT OPEN EYES OR FOLLOW COMMAND. RESPONSIVE TO DEEP 
PAIN STIMULI. BEDSIDE MONITOR SHOWS SB 55-57S. BP 63/48.  CHECKING PT AND ORDERED 
0.9%NS BOLUS. CENTRAL LINE TO RIGHT IJ IN PLACE. AV SHUNT TO LEFT FA. MULTIPLE OLD SCAR TO 
THE WHOLE BODY NOTED. HOB ELEVATED 30 DEGREES WITH LOW BED POSITION, WILL CONTINUE TO 
MONITOR.

## 2019-05-26 NOTE — NUR
BLOOD PRESSURE IS READING LOW. 70/30 ON UPPER RIGHT EXTREMITY. LEFT EXTREMITY HAS AV 
FISTULA, UNABLE TO COMPARE. WILL WAIT 5 MINUTES AND RETAKE.

## 2019-05-26 NOTE — NUR
PT DAUGHTER PRESENTED TO PT'S BEDSIDE AT 1045, PT OPENS EYES AT THIS MOMENT. PT'S DAUGHTER 
TALKED TO PT. OFFERED PT APPLESAUCE, PT WAS ABLE TO SWALLOW. PULLED OUT AM MEDS, PT 
TOLERATED WELL. NOTIFIED DR. ORDAZ PT TOOK HER PO MEDS.

## 2019-05-26 NOTE — NUR
RECEIVED REPORT FROM AM NURSE. PT AT BED EYES CLOSED, BREATHING REGULARLY, ON 2L NC, TEMP. 
98.6, PERRLA 3MM, SLUGGISH, HR 83, S1S2 PRESENT, REGULAR, BP 96/75, PULSES 2+ BILATERAL 
UPPER AND LOWER EXTREMITIES, LUNG SOUNDS CLEAR THROUGHOUT, SYMMETRICAL BREATHING, UNLABORED, 
BOWEL SOUNDS ACTIVE ON ALL QUADRANTS, ABDOMEN, SOFT, ROUND, NONDISTENDED, NONTENDER, SKIN 
NONINTACT, MULTIPLE DRY SCABS ALL OVER THE BODY, RIGHT SHOULDER HAS DRY AND INTACT DRESSING, 
SKIN COLOR APPROPRIATE TO ETHNICITY, NONTENTING, GENERALIZED WEAKNESS, HOB AT 30 DEGREES, 
SIDERAILS UP X2, ALARMS IN PLACE, BED LOW, WILL CONTINUE TO MONITOR PT.

## 2019-05-26 NOTE — NUR
PT'S  AT BEDSIDE. PT STILL UNRESPONSIVE TO VERBAL STIMULI. NO SOB OR RESPIRATORY 
DISTRESS. BEDSIDE MONITOR SHOWS SR WITH BBB.

## 2019-05-26 NOTE — NUR
BLOOD PRESSURE REMAINS LOW ON UPPER RIGHT EXTREMITY. BLOOD PRESSURE IS 77/34 ON RIGHT LOWER 
EXTREMITY. DR. CHONG BAE.

## 2019-05-26 NOTE — NUR
RIGHT IJ CENTRAL LINE WAS DONE BY DR. WALLER, RESULTS STILL PENDING FROM XRAY AT THIS 
TIME. PT AT BED RESTING. SIDERAILS UP X2, HOB AT 30 DEGREES. WILL CONTINUE TO MONITOR PT.

## 2019-05-26 NOTE — NUR
PER ERNESTO IN LAB, MULTIPLE DUPLICATE LAB ORDERED ENTERED. CANCELED ALL BLOOD DRAW ORDERS 
TIMED FOR 2217.

## 2019-05-26 NOTE — NUR
PATIENT O2 SATURATION FLUCTUATES ON RA. CURRENTLY AT 95%. BLOOD PRESSURE IS LOW NORMAL AT 
99/79, HEART RATE IS BRADYCARDIC AT 58. WILL CONTINUE TO MONITOR.

## 2019-05-26 NOTE — NUR
COME TO SEE PT AND MADE HER AWARE THERE IS 2 ORDER OF INSULIN HUMALOG WHICH 2 UNITS 
SQ AT 7;30 AND PRN PER SLIDING SCALE.  MADE AWARE BLOOD SUGAR THIS AM  AND PER 
MD TO GIVE 2 UNITS INSULIN AT THIS TIME AND RECHECK IT ABOUT 30 MINUTE TO ONE HOUR,INSULIN 2 
UNITS GIVEN SQ TO ABD. ENDORESED TO NEXT SHIFT TO RECHECK THE INSULIN.

## 2019-05-26 NOTE — NUR
MADE AWARE THE CHEST X RAY RESULT Right internal jugular line is malpositioned with 
tip in the

left brachiocephalic vein or lower internal jugular vein.PER MD HE WILL F/U. REPORT GIVEN TO 
ROCHELLE RAUSCH AM SHIFT. PT IS NOT STABLE AT THIS TIME BP IS LOW BP 63/48,  AT BED SIDE AND 
AWARE. PER MD TO MONITOR PT CLOSELY AND WILL FOLLOW UP WITH ORDER. ALSO MADE AM SHIFT AWARE 
BLOOD SUGAR  AND 2 UNITS INSULIN GIVEN SQ PER  AND TO RECHECK BETWEEN 30 
MINUTES TO 1 HOUR.

## 2019-05-26 NOTE — NUR
PT WOKE UP, FEED PT SOME FOOD, PT TOLERATED WELL,  AT BEDSIDE. BS FINGER CHECKED 147, 
CLARIFIED WITH DR. ORDAZ REGARDING TWO UNITS INSULIN SUBQ, PER DR. ORDAZ, THESE UNITS ARE HOME 
MEDS GIVEN BEFORE FOOD. PT , WE DON'T GIVE THE TWO UNITS. CHARGE NURSE MADE AWARE.

## 2019-05-26 NOTE — NUR
GAVE BEDSIDE REPORT TO MIRACLE HINSON. PATIENT CONFUSED AT BASELINE, PATIENT UNSTABLE AT THIS 
TIME. TRANSFER TO ICU.

## 2019-05-26 NOTE — NUR
PT ARRIVED ON [facility] AT 0455 WITH MIRACLE ROBISON FROM [facility], MD AT BEDSIDE,  AV SHUNT LEFT FA, 
RIGHT AC 20 GAUGE SL, /59, HR 59, RR 15, S1S2 PRESENT, HR REGULAR, PT IS ALERT AND 
ORIENTED X2, PERRLA 3MM, BRISK, PT REPORTED TO BE DIALYSIS PT AT MON WED FRI, SKIN WARM TO 
TOUCH, SKIN HAS MULTIPLE DRY SCABS ALL OVER, GANGRENE ON LEFT PINKIE, RIGHT SIDED WEAKNESS, 
LUNG SOUNDS CLEAR THROUGHOUT, REGULAR RESPIRATION, NO SIGNS OF DISTRESS, BOWEL SOUNDS ACTIVE 
IN ALL QUADRANTS, NEW ORDER TO INSERT CENTRAL LINE, FAMILY HAD BEEN CONTACTED WITH NO 
ANSWER, STILL ON FOLLOWUP, ULTRASOUND HAS BEEN AWARE, MD PLAN EMERGENCY INSERTION OF CENTRAL 
LINE, CONTINUE TO FOLLOW UP. MONITOR PT CLOSELY

## 2019-05-27 VITALS — DIASTOLIC BLOOD PRESSURE: 49 MMHG | SYSTOLIC BLOOD PRESSURE: 108 MMHG

## 2019-05-27 VITALS — DIASTOLIC BLOOD PRESSURE: 28 MMHG | SYSTOLIC BLOOD PRESSURE: 95 MMHG

## 2019-05-27 VITALS — DIASTOLIC BLOOD PRESSURE: 38 MMHG | SYSTOLIC BLOOD PRESSURE: 86 MMHG

## 2019-05-27 VITALS — SYSTOLIC BLOOD PRESSURE: 90 MMHG | DIASTOLIC BLOOD PRESSURE: 69 MMHG

## 2019-05-27 VITALS — DIASTOLIC BLOOD PRESSURE: 76 MMHG | SYSTOLIC BLOOD PRESSURE: 95 MMHG

## 2019-05-27 VITALS — SYSTOLIC BLOOD PRESSURE: 97 MMHG | DIASTOLIC BLOOD PRESSURE: 73 MMHG

## 2019-05-27 VITALS — SYSTOLIC BLOOD PRESSURE: 100 MMHG | DIASTOLIC BLOOD PRESSURE: 61 MMHG

## 2019-05-27 VITALS — DIASTOLIC BLOOD PRESSURE: 56 MMHG | SYSTOLIC BLOOD PRESSURE: 81 MMHG

## 2019-05-27 VITALS — SYSTOLIC BLOOD PRESSURE: 116 MMHG | DIASTOLIC BLOOD PRESSURE: 38 MMHG

## 2019-05-27 VITALS — DIASTOLIC BLOOD PRESSURE: 78 MMHG | SYSTOLIC BLOOD PRESSURE: 105 MMHG

## 2019-05-27 VITALS — SYSTOLIC BLOOD PRESSURE: 88 MMHG | DIASTOLIC BLOOD PRESSURE: 52 MMHG

## 2019-05-27 VITALS — DIASTOLIC BLOOD PRESSURE: 46 MMHG | SYSTOLIC BLOOD PRESSURE: 96 MMHG

## 2019-05-27 VITALS — SYSTOLIC BLOOD PRESSURE: 87 MMHG | DIASTOLIC BLOOD PRESSURE: 61 MMHG

## 2019-05-27 VITALS — SYSTOLIC BLOOD PRESSURE: 129 MMHG | DIASTOLIC BLOOD PRESSURE: 52 MMHG

## 2019-05-27 VITALS — SYSTOLIC BLOOD PRESSURE: 91 MMHG | DIASTOLIC BLOOD PRESSURE: 61 MMHG

## 2019-05-27 VITALS — SYSTOLIC BLOOD PRESSURE: 90 MMHG | DIASTOLIC BLOOD PRESSURE: 71 MMHG

## 2019-05-27 VITALS — DIASTOLIC BLOOD PRESSURE: 55 MMHG | SYSTOLIC BLOOD PRESSURE: 117 MMHG

## 2019-05-27 VITALS — DIASTOLIC BLOOD PRESSURE: 37 MMHG | SYSTOLIC BLOOD PRESSURE: 97 MMHG

## 2019-05-27 VITALS — DIASTOLIC BLOOD PRESSURE: 59 MMHG | SYSTOLIC BLOOD PRESSURE: 102 MMHG

## 2019-05-27 VITALS — DIASTOLIC BLOOD PRESSURE: 54 MMHG | SYSTOLIC BLOOD PRESSURE: 105 MMHG

## 2019-05-27 VITALS — SYSTOLIC BLOOD PRESSURE: 94 MMHG | DIASTOLIC BLOOD PRESSURE: 47 MMHG

## 2019-05-27 VITALS — DIASTOLIC BLOOD PRESSURE: 72 MMHG | SYSTOLIC BLOOD PRESSURE: 102 MMHG

## 2019-05-27 VITALS — SYSTOLIC BLOOD PRESSURE: 85 MMHG | DIASTOLIC BLOOD PRESSURE: 63 MMHG

## 2019-05-27 VITALS — SYSTOLIC BLOOD PRESSURE: 90 MMHG | DIASTOLIC BLOOD PRESSURE: 59 MMHG

## 2019-05-27 VITALS — SYSTOLIC BLOOD PRESSURE: 98 MMHG | DIASTOLIC BLOOD PRESSURE: 31 MMHG

## 2019-05-27 VITALS — SYSTOLIC BLOOD PRESSURE: 108 MMHG | DIASTOLIC BLOOD PRESSURE: 49 MMHG

## 2019-05-27 VITALS — SYSTOLIC BLOOD PRESSURE: 98 MMHG | DIASTOLIC BLOOD PRESSURE: 67 MMHG

## 2019-05-27 VITALS — SYSTOLIC BLOOD PRESSURE: 89 MMHG | DIASTOLIC BLOOD PRESSURE: 36 MMHG

## 2019-05-27 VITALS — DIASTOLIC BLOOD PRESSURE: 36 MMHG | SYSTOLIC BLOOD PRESSURE: 107 MMHG

## 2019-05-27 VITALS — DIASTOLIC BLOOD PRESSURE: 54 MMHG | SYSTOLIC BLOOD PRESSURE: 81 MMHG

## 2019-05-27 VITALS — DIASTOLIC BLOOD PRESSURE: 51 MMHG | SYSTOLIC BLOOD PRESSURE: 118 MMHG

## 2019-05-27 VITALS — DIASTOLIC BLOOD PRESSURE: 70 MMHG | SYSTOLIC BLOOD PRESSURE: 108 MMHG

## 2019-05-27 VITALS — SYSTOLIC BLOOD PRESSURE: 112 MMHG | DIASTOLIC BLOOD PRESSURE: 59 MMHG

## 2019-05-27 VITALS — DIASTOLIC BLOOD PRESSURE: 43 MMHG | SYSTOLIC BLOOD PRESSURE: 93 MMHG

## 2019-05-27 VITALS — SYSTOLIC BLOOD PRESSURE: 95 MMHG | DIASTOLIC BLOOD PRESSURE: 56 MMHG

## 2019-05-27 VITALS — DIASTOLIC BLOOD PRESSURE: 62 MMHG | SYSTOLIC BLOOD PRESSURE: 107 MMHG

## 2019-05-27 VITALS — SYSTOLIC BLOOD PRESSURE: 93 MMHG | DIASTOLIC BLOOD PRESSURE: 72 MMHG

## 2019-05-27 VITALS — SYSTOLIC BLOOD PRESSURE: 97 MMHG | DIASTOLIC BLOOD PRESSURE: 54 MMHG

## 2019-05-27 VITALS — SYSTOLIC BLOOD PRESSURE: 105 MMHG | DIASTOLIC BLOOD PRESSURE: 44 MMHG

## 2019-05-27 VITALS — DIASTOLIC BLOOD PRESSURE: 37 MMHG | SYSTOLIC BLOOD PRESSURE: 126 MMHG

## 2019-05-27 VITALS — SYSTOLIC BLOOD PRESSURE: 108 MMHG | DIASTOLIC BLOOD PRESSURE: 73 MMHG

## 2019-05-27 VITALS — DIASTOLIC BLOOD PRESSURE: 75 MMHG | SYSTOLIC BLOOD PRESSURE: 102 MMHG

## 2019-05-27 VITALS — DIASTOLIC BLOOD PRESSURE: 72 MMHG | SYSTOLIC BLOOD PRESSURE: 99 MMHG

## 2019-05-27 VITALS — DIASTOLIC BLOOD PRESSURE: 73 MMHG | SYSTOLIC BLOOD PRESSURE: 99 MMHG

## 2019-05-27 VITALS — DIASTOLIC BLOOD PRESSURE: 62 MMHG | SYSTOLIC BLOOD PRESSURE: 106 MMHG

## 2019-05-27 VITALS — DIASTOLIC BLOOD PRESSURE: 72 MMHG | SYSTOLIC BLOOD PRESSURE: 100 MMHG

## 2019-05-27 VITALS — SYSTOLIC BLOOD PRESSURE: 109 MMHG | DIASTOLIC BLOOD PRESSURE: 60 MMHG

## 2019-05-27 VITALS — DIASTOLIC BLOOD PRESSURE: 74 MMHG | SYSTOLIC BLOOD PRESSURE: 106 MMHG

## 2019-05-27 VITALS — DIASTOLIC BLOOD PRESSURE: 55 MMHG | SYSTOLIC BLOOD PRESSURE: 84 MMHG

## 2019-05-27 VITALS — SYSTOLIC BLOOD PRESSURE: 114 MMHG | DIASTOLIC BLOOD PRESSURE: 75 MMHG

## 2019-05-27 VITALS — DIASTOLIC BLOOD PRESSURE: 44 MMHG | SYSTOLIC BLOOD PRESSURE: 100 MMHG

## 2019-05-27 VITALS — DIASTOLIC BLOOD PRESSURE: 64 MMHG | SYSTOLIC BLOOD PRESSURE: 108 MMHG

## 2019-05-27 VITALS — SYSTOLIC BLOOD PRESSURE: 79 MMHG | DIASTOLIC BLOOD PRESSURE: 39 MMHG

## 2019-05-27 VITALS — DIASTOLIC BLOOD PRESSURE: 46 MMHG | SYSTOLIC BLOOD PRESSURE: 124 MMHG

## 2019-05-27 VITALS — SYSTOLIC BLOOD PRESSURE: 108 MMHG | DIASTOLIC BLOOD PRESSURE: 83 MMHG

## 2019-05-27 VITALS — DIASTOLIC BLOOD PRESSURE: 55 MMHG | SYSTOLIC BLOOD PRESSURE: 81 MMHG

## 2019-05-27 VITALS — DIASTOLIC BLOOD PRESSURE: 47 MMHG | SYSTOLIC BLOOD PRESSURE: 95 MMHG

## 2019-05-27 VITALS — SYSTOLIC BLOOD PRESSURE: 95 MMHG | DIASTOLIC BLOOD PRESSURE: 74 MMHG

## 2019-05-27 VITALS — SYSTOLIC BLOOD PRESSURE: 99 MMHG | DIASTOLIC BLOOD PRESSURE: 73 MMHG

## 2019-05-27 VITALS — DIASTOLIC BLOOD PRESSURE: 49 MMHG | SYSTOLIC BLOOD PRESSURE: 87 MMHG

## 2019-05-27 VITALS — DIASTOLIC BLOOD PRESSURE: 30 MMHG | SYSTOLIC BLOOD PRESSURE: 88 MMHG

## 2019-05-27 VITALS — SYSTOLIC BLOOD PRESSURE: 117 MMHG | DIASTOLIC BLOOD PRESSURE: 47 MMHG

## 2019-05-27 VITALS — DIASTOLIC BLOOD PRESSURE: 59 MMHG | SYSTOLIC BLOOD PRESSURE: 84 MMHG

## 2019-05-27 VITALS — SYSTOLIC BLOOD PRESSURE: 98 MMHG | DIASTOLIC BLOOD PRESSURE: 49 MMHG

## 2019-05-27 VITALS — SYSTOLIC BLOOD PRESSURE: 95 MMHG | DIASTOLIC BLOOD PRESSURE: 64 MMHG

## 2019-05-27 VITALS — SYSTOLIC BLOOD PRESSURE: 114 MMHG | DIASTOLIC BLOOD PRESSURE: 49 MMHG

## 2019-05-27 VITALS — SYSTOLIC BLOOD PRESSURE: 100 MMHG | DIASTOLIC BLOOD PRESSURE: 62 MMHG

## 2019-05-27 VITALS — SYSTOLIC BLOOD PRESSURE: 93 MMHG | DIASTOLIC BLOOD PRESSURE: 32 MMHG

## 2019-05-27 VITALS — DIASTOLIC BLOOD PRESSURE: 59 MMHG | SYSTOLIC BLOOD PRESSURE: 103 MMHG

## 2019-05-27 VITALS — SYSTOLIC BLOOD PRESSURE: 86 MMHG | DIASTOLIC BLOOD PRESSURE: 41 MMHG

## 2019-05-27 VITALS — SYSTOLIC BLOOD PRESSURE: 107 MMHG | DIASTOLIC BLOOD PRESSURE: 57 MMHG

## 2019-05-27 VITALS — SYSTOLIC BLOOD PRESSURE: 89 MMHG | DIASTOLIC BLOOD PRESSURE: 55 MMHG

## 2019-05-27 VITALS — DIASTOLIC BLOOD PRESSURE: 34 MMHG | SYSTOLIC BLOOD PRESSURE: 115 MMHG

## 2019-05-27 VITALS — SYSTOLIC BLOOD PRESSURE: 118 MMHG | DIASTOLIC BLOOD PRESSURE: 44 MMHG

## 2019-05-27 VITALS — DIASTOLIC BLOOD PRESSURE: 72 MMHG | SYSTOLIC BLOOD PRESSURE: 90 MMHG

## 2019-05-27 VITALS — SYSTOLIC BLOOD PRESSURE: 105 MMHG | DIASTOLIC BLOOD PRESSURE: 34 MMHG

## 2019-05-27 VITALS — DIASTOLIC BLOOD PRESSURE: 73 MMHG | SYSTOLIC BLOOD PRESSURE: 105 MMHG

## 2019-05-27 VITALS — SYSTOLIC BLOOD PRESSURE: 99 MMHG | DIASTOLIC BLOOD PRESSURE: 43 MMHG

## 2019-05-27 VITALS — DIASTOLIC BLOOD PRESSURE: 56 MMHG | SYSTOLIC BLOOD PRESSURE: 93 MMHG

## 2019-05-27 VITALS — SYSTOLIC BLOOD PRESSURE: 127 MMHG | DIASTOLIC BLOOD PRESSURE: 47 MMHG

## 2019-05-27 VITALS — SYSTOLIC BLOOD PRESSURE: 99 MMHG | DIASTOLIC BLOOD PRESSURE: 69 MMHG

## 2019-05-27 VITALS — SYSTOLIC BLOOD PRESSURE: 105 MMHG | DIASTOLIC BLOOD PRESSURE: 51 MMHG

## 2019-05-27 VITALS — SYSTOLIC BLOOD PRESSURE: 88 MMHG | DIASTOLIC BLOOD PRESSURE: 70 MMHG

## 2019-05-27 VITALS — SYSTOLIC BLOOD PRESSURE: 108 MMHG | DIASTOLIC BLOOD PRESSURE: 87 MMHG

## 2019-05-27 VITALS — DIASTOLIC BLOOD PRESSURE: 77 MMHG | SYSTOLIC BLOOD PRESSURE: 112 MMHG

## 2019-05-27 VITALS — DIASTOLIC BLOOD PRESSURE: 40 MMHG | SYSTOLIC BLOOD PRESSURE: 108 MMHG

## 2019-05-27 VITALS — DIASTOLIC BLOOD PRESSURE: 54 MMHG | SYSTOLIC BLOOD PRESSURE: 76 MMHG

## 2019-05-27 LAB
ALBUMIN FLD-MCNC: 2.3 G/DL (ref 3.4–5)
ANION GAP SERPL CALCULATED.3IONS-SCNC: 16.1 MMOL/L (ref 8–16)
AST SERPL-CCNC: 102 U/L (ref 15–37)
BASOPHILS # BLD AUTO: 0.1 K/UL (ref 0–0.22)
BASOPHILS NFR BLD AUTO: 0.5 % (ref 0–2)
BILIRUB SERPL-MCNC: 0.4 MG/DL (ref 0–1)
BUN SERPL-MCNC: 78 MG/DL (ref 7–18)
CHLORIDE SERPL-SCNC: 100 MMOL/L (ref 98–107)
CO2 SERPL-SCNC: 27.7 MMOL/L (ref 21–32)
CREAT SERPL-MCNC: 6 MG/DL (ref 0.6–1.3)
EOSINOPHIL # BLD AUTO: 0.3 K/UL (ref 0–0.4)
EOSINOPHIL NFR BLD AUTO: 1.4 % (ref 0–4)
ERYTHROCYTE [DISTWIDTH] IN BLOOD BY AUTOMATED COUNT: 17.5 % (ref 11.6–13.7)
GFR SERPL CREATININE-BSD FRML MDRD: 9 ML/MIN (ref 90–?)
GLUCOSE SERPL-MCNC: 165 MG/DL (ref 74–106)
HCT VFR BLD AUTO: 36.1 % (ref 36–48)
HGB BLD-MCNC: 11.1 G/DL (ref 12–16)
LYMPHOCYTES # BLD AUTO: 1.6 K/UL (ref 2.5–16.5)
LYMPHOCYTES NFR BLD AUTO: 8.3 % (ref 20.5–51.1)
MAGNESIUM SERPL-MCNC: 2.6 MG/DL (ref 1.8–2.4)
MCH RBC QN AUTO: 27 PG (ref 27–31)
MCHC RBC AUTO-ENTMCNC: 31 G/DL (ref 33–37)
MCV RBC AUTO: 87.7 FL (ref 80–94)
MONOCYTES # BLD AUTO: 1.4 K/UL (ref 0.8–1)
MONOCYTES NFR BLD AUTO: 7.5 % (ref 1.7–9.3)
NEUTROPHILS # BLD AUTO: 15.8 K/UL (ref 1.8–7.7)
NEUTROPHILS NFR BLD AUTO: 82.3 % (ref 42.2–75.2)
PHOSPHATE SERPL-MCNC: 5.8 MG/DL (ref 2.5–4.9)
PLATELET # BLD AUTO: 401 K/UL (ref 140–450)
POTASSIUM SERPL-SCNC: 4.8 MMOL/L (ref 3.5–5.1)
PROTHROMBIN TIME: 13 SECS (ref 10.8–13.4)
RBC # BLD AUTO: 4.12 MIL/UL (ref 4.2–5.4)
SODIUM SERPL-SCNC: 139 MMOL/L (ref 136–145)
WBC # BLD AUTO: 19.2 K/UL (ref 4.8–10.8)

## 2019-05-27 RX ADMIN — IPRATROPIUM BROMIDE AND ALBUTEROL SULFATE SCH ML: .5; 3 SOLUTION RESPIRATORY (INHALATION) at 13:14

## 2019-05-27 RX ADMIN — INSULIN LISPRO SCH UNITS: 100 INJECTION, SOLUTION INTRAVENOUS; SUBCUTANEOUS at 07:10

## 2019-05-27 RX ADMIN — PIPERACILLIN SODIUM AND TAZOBACTAM SODIUM SCH MLS/HR: 2; .25 INJECTION, SOLUTION INTRAVENOUS at 20:53

## 2019-05-27 RX ADMIN — Medication SCH ML: at 17:47

## 2019-05-27 RX ADMIN — Medication SCH MG: at 10:21

## 2019-05-27 RX ADMIN — NOREPINEPHRINE BITARTRATE PRN MLS/HR: 1 INJECTION INTRAVENOUS at 16:31

## 2019-05-27 RX ADMIN — Medication SCH DEV: at 11:30

## 2019-05-27 RX ADMIN — Medication SCH DEV: at 21:28

## 2019-05-27 RX ADMIN — Medication SCH DEV: at 07:04

## 2019-05-27 RX ADMIN — Medication SCH MG: at 17:47

## 2019-05-27 RX ADMIN — PIPERACILLIN SODIUM AND TAZOBACTAM SODIUM SCH MLS/HR: 2; .25 INJECTION, SOLUTION INTRAVENOUS at 04:26

## 2019-05-27 RX ADMIN — PIPERACILLIN SODIUM AND TAZOBACTAM SODIUM SCH MLS/HR: 2; .25 INJECTION, SOLUTION INTRAVENOUS at 15:53

## 2019-05-27 RX ADMIN — Medication SCH DEV: at 16:52

## 2019-05-27 RX ADMIN — INSULIN LISPRO PRN UNITS: 100 INJECTION, SOLUTION INTRAVENOUS; SUBCUTANEOUS at 21:34

## 2019-05-27 RX ADMIN — INSULIN LISPRO SCH UNITS: 100 INJECTION, SOLUTION INTRAVENOUS; SUBCUTANEOUS at 16:30

## 2019-05-27 RX ADMIN — MUPIROCIN SCH GM: 20 OINTMENT TOPICAL at 20:53

## 2019-05-27 RX ADMIN — IPRATROPIUM BROMIDE AND ALBUTEROL SULFATE SCH ML: .5; 3 SOLUTION RESPIRATORY (INHALATION) at 19:03

## 2019-05-27 RX ADMIN — IPRATROPIUM BROMIDE AND ALBUTEROL SULFATE SCH ML: .5; 3 SOLUTION RESPIRATORY (INHALATION) at 07:00

## 2019-05-27 RX ADMIN — Medication SCH ML: at 09:10

## 2019-05-27 RX ADMIN — LORATADINE PRN MG: 10 TABLET ORAL at 23:30

## 2019-05-27 RX ADMIN — Medication SCH TAB: at 20:53

## 2019-05-27 RX ADMIN — Medication SCH ML: at 20:54

## 2019-05-27 RX ADMIN — SEVELAMER CARBONATE SCH MG: 800 TABLET, FILM COATED ORAL at 17:47

## 2019-05-27 RX ADMIN — SODIUM CHLORIDE SCH MLS/HR: 9 INJECTION, SOLUTION INTRAVENOUS at 17:48

## 2019-05-27 RX ADMIN — MIRTAZAPINE SCH MG: 15 TABLET, FILM COATED ORAL at 20:52

## 2019-05-27 RX ADMIN — SEVELAMER CARBONATE SCH MG: 800 TABLET, FILM COATED ORAL at 10:21

## 2019-05-27 RX ADMIN — INSULIN LISPRO PRN UNITS: 100 INJECTION, SOLUTION INTRAVENOUS; SUBCUTANEOUS at 11:30

## 2019-05-27 RX ADMIN — SEVELAMER CARBONATE SCH MG: 800 TABLET, FILM COATED ORAL at 13:09

## 2019-05-27 RX ADMIN — Medication SCH MG: at 10:22

## 2019-05-27 RX ADMIN — Medication SCH ML: at 13:09

## 2019-05-27 RX ADMIN — FAMOTIDINE SCH MG: 20 TABLET ORAL at 10:21

## 2019-05-27 RX ADMIN — INSULIN LISPRO SCH UNITS: 100 INJECTION, SOLUTION INTRAVENOUS; SUBCUTANEOUS at 11:30

## 2019-05-27 RX ADMIN — MUPIROCIN SCH GM: 20 OINTMENT TOPICAL at 09:10

## 2019-05-27 RX ADMIN — INSULIN LISPRO PRN UNITS: 100 INJECTION, SOLUTION INTRAVENOUS; SUBCUTANEOUS at 16:54

## 2019-05-27 RX ADMIN — Medication SCH MG: at 13:15

## 2019-05-27 NOTE — NUR
PT AT BED EYES  CLOSED. VS WNL. HOB AT 30 DEGREES, SIDE RAILS UP X2, ALARMS IN PLACE. WILL 
CONTINUE TO MONITOR PT.

## 2019-05-27 NOTE — NUR
DR. FONSECA IN TO SEE PATIENT, UPDATED ON PT CONDITION AND IV ABX. STATED PT DOES NOT NEED TO 
BE ON C-DIFF PRECAUTIONS AT THIS TIME, SINCE NO BM, NO SAMPLE CAN BE COLLECTED. TO WAIT TO 
FOLLOW UP ON URINE/BLOOD CULTURES. NO NEW ORDERS AT THIS TIME.

## 2019-05-27 NOTE — NUR
AM CARE DONE. PT TOLERATED PROCEDURE WELL. PT AT BED EYES CLOSED, RESTING, HOB AT 30 
DEGREES, SIDE RAILS UP X2, ALARMS IN PLACE. WILL CONTINUE TO [place].

## 2019-05-27 NOTE — NUR
MITTENS APPLIED ON PT DUE TO ATTEMPTS TO REMOVE RIGHT IJ CENTRAL LINE, VS STABLE. HOB 30 
DEGREES, SIDE RAILS UP X2, ALARMS IN PLACE. WILL CONTINUE TO MONITOR PT.

## 2019-05-27 NOTE — NUR
RECEIVED BEDSIDE REPORT FROM MORNING SHIFT RN, ROCHELLE. PT IS AWAKE, RESPONDS TO NAME, UNABLE 
TO MAKE NEEDS KNOWN. VSS. /54, RR=26, SATING 100%, CVP=15, HR=84. LUNG SOUNDS CLEAR ON 
UPPER BILATERAL LOBES, RT LOKI AT BEDSIDE ADMINISTERED BREATHING TREATMENT, ON ROOM AIR. 
BOWEL SOUND ACTIVE X4 QUADRANTS. SR ON CARDIAC MONITOR. PT HAS RIGHT IJ, INFUSING LEVOPHED 
DRIP AT 6MCG/MIN, AND NS AT 30CC/HR. PT HS LEFT AV SHUNT. 

SKIN IS NON-INTACT, PT SCRATCHES SKIN INTERMITTENTLY AND MINOR OPEN SKIN TEARS THROUGHOUT ON 
UPPER BODY AND EXTREMITIES. ALLERGY TO MORPHINE, FALL RISK, NO BP OR BLOOD DRAW WON LEFT 
ARM. CONTACT /CDIFF PRECAUTIONS MAINTAINED.

## 2019-05-27 NOTE — NUR
pt woke up, feed pt. pt ate 35% of the food. morning meds given. dr. mendez at bedside, 
updated pt condition, no new order received.

## 2019-05-27 NOTE — NUR
PT ABLE TO TOLERATE PO MEDS, LARGER PILLS CUT IN HALF. PT SHOWED NO SIGNS OF DIFFICULTY 
SWALLOWING PILL OR WATER. PT REPOSITIONED AT THIS TIME. PT SCRATCHES SKIN INTERMITTENTLY AND 
WAKES UP MOANING, APPEAR TO UNDERSTAND COMMANDS.

## 2019-05-27 NOTE — NUR
05/27/19 RD INITIAL ASSESSMENT COMPLETED



PLEASE REFER TO NUTRITION ASSESSMENT UNDER CARE ACTIVITY FOR ESTIMATED NUTRITIONAL NEEDS. 



RD RECOMMENDATIONS:



1. RECOMMEND CONTINUE PUREE DIET.ASSIST AND ENCOURAGE INCREASED PO INTAKE. 



2. ADVANCE DIET AS TOLERATED AND MEDICALLY CLEARED TO RENAL (PT'S DIET 

BEFORE ADMIT), HONOR FOOD PREFERENCES.



3. F/U 2-3 DAYS; HIGH RISK.



RYLEE FISHMAN MBA, RD

## 2019-05-27 NOTE — NUR
PT SLEEPING, UNABLE TO GIVE PO MEDS, DR. STATON AND DR. ORDAZ AT BEDSIDE. RELEASED RESTRAIN, 
WILL MONITOR PT CLOSELY.

## 2019-05-27 NOTE — NUR
DR. RODGERS AT BEDSIDE TO SEE PATIENT, STATED TO D/C LOVENOX  ORDER AND START PATIENT ON 
5000 UNITS HEPARIN SUBQ BID.

## 2019-05-27 NOTE — NUR
RECEIVED CALL FROM AMAIRANI FROM LAB REGARDING LAB RESULT OF BUN 78. AT 0631. INFORMED DR. SCHWARZ 
WITH CRITICAL LAB RESULT AT 0631. NO NEW ORDERS.

## 2019-05-27 NOTE — NUR
RECEIVED PT FROM PM NURSE. PT SLEEPING, BEDSIDE MONITOR SHOWS SR, ON O2 NC 2L/MIN. NO S/S OF 
RESPIRATORY DISTRESS NOTED. LUNG SOUND CLEAR, S1 S2 HEARD. PT HAS IV TO RIGHT IJ TLC RUNNING 
LEVOPHED 16 MG IN 0.9%  RUNNING AT 6 MCG/MIN AND 0.9 % NS AT 30 CC/HR. PT HAS IV SHUNT 
TO LEFT ARM. RIGHT SIDE EXTREMITIES WEAKNESS NOTED. PT'S LEFT HAD ON RESTRAIN DUE TO PT 
TRIED TO PULL OUT CENTRAL LINE. BP CUFF TO RIGHT CALF. MULTIPLE SKIN SCAR NOTED ON PT'S 
BODY. HOB ELEVATED 30 DEGREES WITH LOW BED POSITION, WILL CONTINUE TO MONITOR PT.

## 2019-05-27 NOTE — NUR
BED BATH DONE, THE WHOLE BED CHANGED. PUT CREAM ON PT'S BODY. PT'S DAUGHTER AND  AT 
BEDSIDE. AFTER THAT, PT FALL ASLEEP.

## 2019-05-27 NOTE — NUR
CALLED ON CALL PHARMACY, STATED THAT SINCE THE OF PREVIOUS 80MG LOVENOX WAS GIVEN EARLIER 
TODAY, WILL CANCEL THE HEPARIN SUBQ DOSE DUE NOW AND PLAN TO ADMINISTER HEPARIN SUB TOMORROW 
MORNING 9AM DOSE.

## 2019-05-27 NOTE — NUR
DR. ORDAZ AT BEDSIDE. UPDATED ON PT CONDITION. NO NEW ORDERS. VS STABLE ON PT. HOB AT 30 
DEGREES, SIDE RAILS UP X2, BED AT LOW POSITION, ALARMS IN PLACE. WILL CONTINUE TO MONITOR.

## 2019-05-28 VITALS — DIASTOLIC BLOOD PRESSURE: 58 MMHG | SYSTOLIC BLOOD PRESSURE: 127 MMHG

## 2019-05-28 VITALS — DIASTOLIC BLOOD PRESSURE: 65 MMHG | SYSTOLIC BLOOD PRESSURE: 115 MMHG

## 2019-05-28 VITALS — SYSTOLIC BLOOD PRESSURE: 102 MMHG | DIASTOLIC BLOOD PRESSURE: 54 MMHG

## 2019-05-28 VITALS — DIASTOLIC BLOOD PRESSURE: 74 MMHG | SYSTOLIC BLOOD PRESSURE: 114 MMHG

## 2019-05-28 VITALS — DIASTOLIC BLOOD PRESSURE: 58 MMHG | SYSTOLIC BLOOD PRESSURE: 72 MMHG

## 2019-05-28 VITALS — DIASTOLIC BLOOD PRESSURE: 42 MMHG | SYSTOLIC BLOOD PRESSURE: 83 MMHG

## 2019-05-28 VITALS — SYSTOLIC BLOOD PRESSURE: 97 MMHG | DIASTOLIC BLOOD PRESSURE: 55 MMHG

## 2019-05-28 VITALS — DIASTOLIC BLOOD PRESSURE: 55 MMHG | SYSTOLIC BLOOD PRESSURE: 108 MMHG

## 2019-05-28 VITALS — SYSTOLIC BLOOD PRESSURE: 115 MMHG | DIASTOLIC BLOOD PRESSURE: 58 MMHG

## 2019-05-28 VITALS — DIASTOLIC BLOOD PRESSURE: 71 MMHG | SYSTOLIC BLOOD PRESSURE: 118 MMHG

## 2019-05-28 VITALS — DIASTOLIC BLOOD PRESSURE: 72 MMHG | SYSTOLIC BLOOD PRESSURE: 91 MMHG

## 2019-05-28 VITALS — DIASTOLIC BLOOD PRESSURE: 54 MMHG | SYSTOLIC BLOOD PRESSURE: 90 MMHG

## 2019-05-28 VITALS — DIASTOLIC BLOOD PRESSURE: 73 MMHG | SYSTOLIC BLOOD PRESSURE: 87 MMHG

## 2019-05-28 VITALS — DIASTOLIC BLOOD PRESSURE: 55 MMHG | SYSTOLIC BLOOD PRESSURE: 86 MMHG

## 2019-05-28 VITALS — DIASTOLIC BLOOD PRESSURE: 46 MMHG | SYSTOLIC BLOOD PRESSURE: 110 MMHG

## 2019-05-28 VITALS — SYSTOLIC BLOOD PRESSURE: 139 MMHG | DIASTOLIC BLOOD PRESSURE: 64 MMHG

## 2019-05-28 VITALS — DIASTOLIC BLOOD PRESSURE: 30 MMHG | SYSTOLIC BLOOD PRESSURE: 60 MMHG

## 2019-05-28 VITALS — DIASTOLIC BLOOD PRESSURE: 94 MMHG | SYSTOLIC BLOOD PRESSURE: 111 MMHG

## 2019-05-28 VITALS — DIASTOLIC BLOOD PRESSURE: 51 MMHG | SYSTOLIC BLOOD PRESSURE: 105 MMHG

## 2019-05-28 VITALS — DIASTOLIC BLOOD PRESSURE: 40 MMHG | SYSTOLIC BLOOD PRESSURE: 116 MMHG

## 2019-05-28 VITALS — SYSTOLIC BLOOD PRESSURE: 133 MMHG | DIASTOLIC BLOOD PRESSURE: 59 MMHG

## 2019-05-28 VITALS — DIASTOLIC BLOOD PRESSURE: 68 MMHG | SYSTOLIC BLOOD PRESSURE: 97 MMHG

## 2019-05-28 VITALS — SYSTOLIC BLOOD PRESSURE: 93 MMHG | DIASTOLIC BLOOD PRESSURE: 49 MMHG

## 2019-05-28 VITALS — SYSTOLIC BLOOD PRESSURE: 76 MMHG | DIASTOLIC BLOOD PRESSURE: 42 MMHG

## 2019-05-28 VITALS — DIASTOLIC BLOOD PRESSURE: 62 MMHG | SYSTOLIC BLOOD PRESSURE: 117 MMHG

## 2019-05-28 VITALS — DIASTOLIC BLOOD PRESSURE: 73 MMHG | SYSTOLIC BLOOD PRESSURE: 133 MMHG

## 2019-05-28 VITALS — DIASTOLIC BLOOD PRESSURE: 37 MMHG | SYSTOLIC BLOOD PRESSURE: 54 MMHG

## 2019-05-28 VITALS — SYSTOLIC BLOOD PRESSURE: 85 MMHG | DIASTOLIC BLOOD PRESSURE: 68 MMHG

## 2019-05-28 VITALS — DIASTOLIC BLOOD PRESSURE: 53 MMHG | SYSTOLIC BLOOD PRESSURE: 106 MMHG

## 2019-05-28 VITALS — DIASTOLIC BLOOD PRESSURE: 63 MMHG | SYSTOLIC BLOOD PRESSURE: 90 MMHG

## 2019-05-28 VITALS — DIASTOLIC BLOOD PRESSURE: 63 MMHG | SYSTOLIC BLOOD PRESSURE: 83 MMHG

## 2019-05-28 VITALS — DIASTOLIC BLOOD PRESSURE: 45 MMHG | SYSTOLIC BLOOD PRESSURE: 114 MMHG

## 2019-05-28 VITALS — DIASTOLIC BLOOD PRESSURE: 96 MMHG | SYSTOLIC BLOOD PRESSURE: 120 MMHG

## 2019-05-28 VITALS — DIASTOLIC BLOOD PRESSURE: 49 MMHG | SYSTOLIC BLOOD PRESSURE: 112 MMHG

## 2019-05-28 VITALS — SYSTOLIC BLOOD PRESSURE: 92 MMHG | DIASTOLIC BLOOD PRESSURE: 62 MMHG

## 2019-05-28 VITALS — DIASTOLIC BLOOD PRESSURE: 34 MMHG | SYSTOLIC BLOOD PRESSURE: 107 MMHG

## 2019-05-28 VITALS — DIASTOLIC BLOOD PRESSURE: 54 MMHG | SYSTOLIC BLOOD PRESSURE: 69 MMHG

## 2019-05-28 VITALS — SYSTOLIC BLOOD PRESSURE: 52 MMHG | DIASTOLIC BLOOD PRESSURE: 26 MMHG

## 2019-05-28 VITALS — DIASTOLIC BLOOD PRESSURE: 29 MMHG | SYSTOLIC BLOOD PRESSURE: 104 MMHG

## 2019-05-28 VITALS — DIASTOLIC BLOOD PRESSURE: 88 MMHG | SYSTOLIC BLOOD PRESSURE: 108 MMHG

## 2019-05-28 VITALS — SYSTOLIC BLOOD PRESSURE: 96 MMHG | DIASTOLIC BLOOD PRESSURE: 71 MMHG

## 2019-05-28 VITALS — SYSTOLIC BLOOD PRESSURE: 90 MMHG | DIASTOLIC BLOOD PRESSURE: 65 MMHG

## 2019-05-28 VITALS — SYSTOLIC BLOOD PRESSURE: 156 MMHG | DIASTOLIC BLOOD PRESSURE: 47 MMHG

## 2019-05-28 VITALS — DIASTOLIC BLOOD PRESSURE: 57 MMHG | SYSTOLIC BLOOD PRESSURE: 139 MMHG

## 2019-05-28 VITALS — SYSTOLIC BLOOD PRESSURE: 72 MMHG | DIASTOLIC BLOOD PRESSURE: 39 MMHG

## 2019-05-28 VITALS — DIASTOLIC BLOOD PRESSURE: 54 MMHG | SYSTOLIC BLOOD PRESSURE: 105 MMHG

## 2019-05-28 VITALS — DIASTOLIC BLOOD PRESSURE: 72 MMHG | SYSTOLIC BLOOD PRESSURE: 124 MMHG

## 2019-05-28 VITALS — DIASTOLIC BLOOD PRESSURE: 76 MMHG | SYSTOLIC BLOOD PRESSURE: 126 MMHG

## 2019-05-28 VITALS — DIASTOLIC BLOOD PRESSURE: 98 MMHG | SYSTOLIC BLOOD PRESSURE: 133 MMHG

## 2019-05-28 VITALS — DIASTOLIC BLOOD PRESSURE: 49 MMHG | SYSTOLIC BLOOD PRESSURE: 103 MMHG

## 2019-05-28 VITALS — SYSTOLIC BLOOD PRESSURE: 115 MMHG | DIASTOLIC BLOOD PRESSURE: 75 MMHG

## 2019-05-28 VITALS — DIASTOLIC BLOOD PRESSURE: 73 MMHG | SYSTOLIC BLOOD PRESSURE: 96 MMHG

## 2019-05-28 VITALS — SYSTOLIC BLOOD PRESSURE: 107 MMHG | DIASTOLIC BLOOD PRESSURE: 77 MMHG

## 2019-05-28 VITALS — DIASTOLIC BLOOD PRESSURE: 37 MMHG | SYSTOLIC BLOOD PRESSURE: 109 MMHG

## 2019-05-28 VITALS — SYSTOLIC BLOOD PRESSURE: 112 MMHG | DIASTOLIC BLOOD PRESSURE: 58 MMHG

## 2019-05-28 VITALS — SYSTOLIC BLOOD PRESSURE: 142 MMHG | DIASTOLIC BLOOD PRESSURE: 80 MMHG

## 2019-05-28 VITALS — DIASTOLIC BLOOD PRESSURE: 46 MMHG | SYSTOLIC BLOOD PRESSURE: 96 MMHG

## 2019-05-28 VITALS — SYSTOLIC BLOOD PRESSURE: 76 MMHG | DIASTOLIC BLOOD PRESSURE: 53 MMHG

## 2019-05-28 VITALS — DIASTOLIC BLOOD PRESSURE: 62 MMHG | SYSTOLIC BLOOD PRESSURE: 100 MMHG

## 2019-05-28 VITALS — DIASTOLIC BLOOD PRESSURE: 35 MMHG | SYSTOLIC BLOOD PRESSURE: 100 MMHG

## 2019-05-28 VITALS — DIASTOLIC BLOOD PRESSURE: 61 MMHG | SYSTOLIC BLOOD PRESSURE: 113 MMHG

## 2019-05-28 VITALS — SYSTOLIC BLOOD PRESSURE: 66 MMHG | DIASTOLIC BLOOD PRESSURE: 26 MMHG

## 2019-05-28 VITALS — SYSTOLIC BLOOD PRESSURE: 87 MMHG | DIASTOLIC BLOOD PRESSURE: 41 MMHG

## 2019-05-28 VITALS — DIASTOLIC BLOOD PRESSURE: 28 MMHG | SYSTOLIC BLOOD PRESSURE: 92 MMHG

## 2019-05-28 VITALS — SYSTOLIC BLOOD PRESSURE: 115 MMHG | DIASTOLIC BLOOD PRESSURE: 91 MMHG

## 2019-05-28 VITALS — DIASTOLIC BLOOD PRESSURE: 64 MMHG | SYSTOLIC BLOOD PRESSURE: 106 MMHG

## 2019-05-28 VITALS — DIASTOLIC BLOOD PRESSURE: 44 MMHG | SYSTOLIC BLOOD PRESSURE: 103 MMHG

## 2019-05-28 VITALS — SYSTOLIC BLOOD PRESSURE: 115 MMHG | DIASTOLIC BLOOD PRESSURE: 41 MMHG

## 2019-05-28 VITALS — SYSTOLIC BLOOD PRESSURE: 98 MMHG | DIASTOLIC BLOOD PRESSURE: 42 MMHG

## 2019-05-28 VITALS — SYSTOLIC BLOOD PRESSURE: 104 MMHG | DIASTOLIC BLOOD PRESSURE: 49 MMHG

## 2019-05-28 VITALS — SYSTOLIC BLOOD PRESSURE: 106 MMHG | DIASTOLIC BLOOD PRESSURE: 63 MMHG

## 2019-05-28 VITALS — DIASTOLIC BLOOD PRESSURE: 63 MMHG | SYSTOLIC BLOOD PRESSURE: 108 MMHG

## 2019-05-28 VITALS — SYSTOLIC BLOOD PRESSURE: 90 MMHG | DIASTOLIC BLOOD PRESSURE: 35 MMHG

## 2019-05-28 VITALS — SYSTOLIC BLOOD PRESSURE: 111 MMHG | DIASTOLIC BLOOD PRESSURE: 44 MMHG

## 2019-05-28 VITALS — SYSTOLIC BLOOD PRESSURE: 91 MMHG | DIASTOLIC BLOOD PRESSURE: 55 MMHG

## 2019-05-28 VITALS — SYSTOLIC BLOOD PRESSURE: 111 MMHG | DIASTOLIC BLOOD PRESSURE: 49 MMHG

## 2019-05-28 VITALS — DIASTOLIC BLOOD PRESSURE: 59 MMHG | SYSTOLIC BLOOD PRESSURE: 83 MMHG

## 2019-05-28 VITALS — DIASTOLIC BLOOD PRESSURE: 47 MMHG | SYSTOLIC BLOOD PRESSURE: 62 MMHG

## 2019-05-28 VITALS — DIASTOLIC BLOOD PRESSURE: 82 MMHG | SYSTOLIC BLOOD PRESSURE: 110 MMHG

## 2019-05-28 VITALS — DIASTOLIC BLOOD PRESSURE: 74 MMHG | SYSTOLIC BLOOD PRESSURE: 110 MMHG

## 2019-05-28 VITALS — SYSTOLIC BLOOD PRESSURE: 116 MMHG | DIASTOLIC BLOOD PRESSURE: 49 MMHG

## 2019-05-28 VITALS — DIASTOLIC BLOOD PRESSURE: 46 MMHG | SYSTOLIC BLOOD PRESSURE: 66 MMHG

## 2019-05-28 VITALS — SYSTOLIC BLOOD PRESSURE: 88 MMHG | DIASTOLIC BLOOD PRESSURE: 64 MMHG

## 2019-05-28 VITALS — DIASTOLIC BLOOD PRESSURE: 47 MMHG | SYSTOLIC BLOOD PRESSURE: 123 MMHG

## 2019-05-28 VITALS — SYSTOLIC BLOOD PRESSURE: 103 MMHG | DIASTOLIC BLOOD PRESSURE: 52 MMHG

## 2019-05-28 VITALS — SYSTOLIC BLOOD PRESSURE: 95 MMHG | DIASTOLIC BLOOD PRESSURE: 63 MMHG

## 2019-05-28 VITALS — DIASTOLIC BLOOD PRESSURE: 47 MMHG | SYSTOLIC BLOOD PRESSURE: 118 MMHG

## 2019-05-28 VITALS — SYSTOLIC BLOOD PRESSURE: 117 MMHG | DIASTOLIC BLOOD PRESSURE: 50 MMHG

## 2019-05-28 VITALS — DIASTOLIC BLOOD PRESSURE: 63 MMHG | SYSTOLIC BLOOD PRESSURE: 95 MMHG

## 2019-05-28 VITALS — SYSTOLIC BLOOD PRESSURE: 117 MMHG | DIASTOLIC BLOOD PRESSURE: 41 MMHG

## 2019-05-28 VITALS — SYSTOLIC BLOOD PRESSURE: 126 MMHG | DIASTOLIC BLOOD PRESSURE: 53 MMHG

## 2019-05-28 VITALS — SYSTOLIC BLOOD PRESSURE: 138 MMHG | DIASTOLIC BLOOD PRESSURE: 76 MMHG

## 2019-05-28 VITALS — DIASTOLIC BLOOD PRESSURE: 80 MMHG | SYSTOLIC BLOOD PRESSURE: 119 MMHG

## 2019-05-28 VITALS — SYSTOLIC BLOOD PRESSURE: 109 MMHG | DIASTOLIC BLOOD PRESSURE: 69 MMHG

## 2019-05-28 LAB
ANION GAP SERPL CALCULATED.3IONS-SCNC: 15.5 MMOL/L (ref 8–16)
BASOPHILS # BLD AUTO: 0.1 K/UL (ref 0–0.22)
BASOPHILS NFR BLD AUTO: 0.6 % (ref 0–2)
BUN SERPL-MCNC: 39 MG/DL (ref 7–18)
CHLORIDE SERPL-SCNC: 100 MMOL/L (ref 98–107)
CO2 SERPL-SCNC: 25.4 MMOL/L (ref 21–32)
CREAT SERPL-MCNC: 4.3 MG/DL (ref 0.6–1.3)
EOSINOPHIL # BLD AUTO: 0.4 K/UL (ref 0–0.4)
EOSINOPHIL NFR BLD AUTO: 2.8 % (ref 0–4)
ERYTHROCYTE [DISTWIDTH] IN BLOOD BY AUTOMATED COUNT: 17.4 % (ref 11.6–13.7)
GFR SERPL CREATININE-BSD FRML MDRD: 14 ML/MIN (ref 90–?)
GLUCOSE SERPL-MCNC: 165 MG/DL (ref 74–106)
HCT VFR BLD AUTO: 36.1 % (ref 36–48)
HGB BLD-MCNC: 10.9 G/DL (ref 12–16)
LYMPHOCYTES # BLD AUTO: 1.3 K/UL (ref 2.5–16.5)
LYMPHOCYTES NFR BLD AUTO: 8.6 % (ref 20.5–51.1)
MAGNESIUM SERPL-MCNC: 2 MG/DL (ref 1.8–2.4)
MCH RBC QN AUTO: 26 PG (ref 27–31)
MCHC RBC AUTO-ENTMCNC: 30 G/DL (ref 33–37)
MCV RBC AUTO: 87.4 FL (ref 80–94)
MONOCYTES # BLD AUTO: 1.3 K/UL (ref 0.8–1)
MONOCYTES NFR BLD AUTO: 8.3 % (ref 1.7–9.3)
NEUTROPHILS # BLD AUTO: 12 K/UL (ref 1.8–7.7)
NEUTROPHILS NFR BLD AUTO: 79.7 % (ref 42.2–75.2)
PHOSPHATE SERPL-MCNC: 3.9 MG/DL (ref 2.5–4.9)
PLATELET # BLD AUTO: 377 K/UL (ref 140–450)
POTASSIUM SERPL-SCNC: 3.9 MMOL/L (ref 3.5–5.1)
RBC # BLD AUTO: 4.13 MIL/UL (ref 4.2–5.4)
SODIUM SERPL-SCNC: 137 MMOL/L (ref 136–145)
WBC # BLD AUTO: 15.1 K/UL (ref 4.8–10.8)

## 2019-05-28 RX ADMIN — SEVELAMER CARBONATE SCH MG: 800 TABLET, FILM COATED ORAL at 08:14

## 2019-05-28 RX ADMIN — Medication SCH DEV: at 16:36

## 2019-05-28 RX ADMIN — MIDODRINE HYDROCHLORIDE SCH MG: 5 TABLET ORAL at 09:17

## 2019-05-28 RX ADMIN — MIDODRINE HYDROCHLORIDE SCH MG: 5 TABLET ORAL at 12:32

## 2019-05-28 RX ADMIN — Medication SCH DEV: at 21:07

## 2019-05-28 RX ADMIN — Medication SCH MG: at 16:44

## 2019-05-28 RX ADMIN — SODIUM CHLORIDE SCH MLS/HR: 9 INJECTION, SOLUTION INTRAVENOUS at 18:01

## 2019-05-28 RX ADMIN — INSULIN LISPRO PRN UNITS: 100 INJECTION, SOLUTION INTRAVENOUS; SUBCUTANEOUS at 21:05

## 2019-05-28 RX ADMIN — SEVELAMER CARBONATE SCH MG: 800 TABLET, FILM COATED ORAL at 12:01

## 2019-05-28 RX ADMIN — Medication SCH MG: at 08:14

## 2019-05-28 RX ADMIN — Medication SCH MG: at 12:01

## 2019-05-28 RX ADMIN — IPRATROPIUM BROMIDE AND ALBUTEROL SULFATE SCH ML: .5; 3 SOLUTION RESPIRATORY (INHALATION) at 06:47

## 2019-05-28 RX ADMIN — Medication SCH ML: at 20:41

## 2019-05-28 RX ADMIN — INSULIN LISPRO PRN UNITS: 100 INJECTION, SOLUTION INTRAVENOUS; SUBCUTANEOUS at 16:47

## 2019-05-28 RX ADMIN — FAMOTIDINE SCH MG: 20 TABLET ORAL at 08:14

## 2019-05-28 RX ADMIN — SEVELAMER CARBONATE SCH MG: 800 TABLET, FILM COATED ORAL at 16:44

## 2019-05-28 RX ADMIN — NOREPINEPHRINE BITARTRATE PRN MLS/HR: 1 INJECTION INTRAVENOUS at 21:05

## 2019-05-28 RX ADMIN — INSULIN LISPRO PRN UNITS: 100 INJECTION, SOLUTION INTRAVENOUS; SUBCUTANEOUS at 12:16

## 2019-05-28 RX ADMIN — MUPIROCIN SCH GM: 20 OINTMENT TOPICAL at 08:19

## 2019-05-28 RX ADMIN — MUPIROCIN SCH GM: 20 OINTMENT TOPICAL at 20:41

## 2019-05-28 RX ADMIN — Medication SCH ML: at 12:32

## 2019-05-28 RX ADMIN — INSULIN LISPRO SCH UNITS: 100 INJECTION, SOLUTION INTRAVENOUS; SUBCUTANEOUS at 08:17

## 2019-05-28 RX ADMIN — INSULIN LISPRO SCH UNITS: 100 INJECTION, SOLUTION INTRAVENOUS; SUBCUTANEOUS at 12:15

## 2019-05-28 RX ADMIN — Medication SCH TAB: at 20:39

## 2019-05-28 RX ADMIN — Medication SCH DEV: at 06:54

## 2019-05-28 RX ADMIN — PIPERACILLIN SODIUM AND TAZOBACTAM SODIUM SCH MLS/HR: 2; .25 INJECTION, SOLUTION INTRAVENOUS at 05:05

## 2019-05-28 RX ADMIN — IPRATROPIUM BROMIDE AND ALBUTEROL SULFATE SCH ML: .5; 3 SOLUTION RESPIRATORY (INHALATION) at 19:23

## 2019-05-28 RX ADMIN — MIRTAZAPINE SCH MG: 15 TABLET, FILM COATED ORAL at 20:39

## 2019-05-28 RX ADMIN — INSULIN LISPRO SCH UNITS: 100 INJECTION, SOLUTION INTRAVENOUS; SUBCUTANEOUS at 16:47

## 2019-05-28 RX ADMIN — HYDROCORTISONE SODIUM SUCCINATE SCH MG: 100 INJECTION, POWDER, FOR SOLUTION INTRAMUSCULAR; INTRAVENOUS at 20:39

## 2019-05-28 RX ADMIN — IPRATROPIUM BROMIDE AND ALBUTEROL SULFATE SCH ML: .5; 3 SOLUTION RESPIRATORY (INHALATION) at 13:00

## 2019-05-28 RX ADMIN — Medication SCH ML: at 16:45

## 2019-05-28 RX ADMIN — PIPERACILLIN SODIUM AND TAZOBACTAM SODIUM SCH MLS/HR: 2; .25 INJECTION, SOLUTION INTRAVENOUS at 12:01

## 2019-05-28 RX ADMIN — Medication SCH DEV: at 11:55

## 2019-05-28 RX ADMIN — MIDODRINE HYDROCHLORIDE SCH MG: 5 TABLET ORAL at 20:40

## 2019-05-28 RX ADMIN — Medication SCH ML: at 08:18

## 2019-05-28 RX ADMIN — Medication SCH MG: at 06:55

## 2019-05-28 RX ADMIN — PIPERACILLIN SODIUM AND TAZOBACTAM SODIUM SCH MLS/HR: 2; .25 INJECTION, SOLUTION INTRAVENOUS at 20:40

## 2019-05-28 NOTE — NUR
DR. RILEY  UPDATED ORDERS WITH MONTELUKAST AND BENADRY IVP FOR PT ITCH RELIEF. EDUCATED 
PATIENT ON PURPOSE OF MED/ITCHING, PT NODDED FOR UNDERSTANDING.

## 2019-05-28 NOTE — NUR
PT IS MORE CALM AT THIS TIME, NO LONGER MOANING. PT CONTINUES TO SCRATCH PARTS BODY, 
ESPECIALLY LEFT NECK AREA. SITES WERE CLEANSED AND SKIN TEARS WERE COVERED WITH MILD 
DRESSING.

## 2019-05-28 NOTE — NUR
NO CHANGE OF CONDITION, NO S/S OF DISTRESS, VSS, FLACC 0, LETHARGIC, POSITION CHANGED FOR 
OFF LOAD PRESSURE.

## 2019-05-28 NOTE — NUR
DR ORDAZ AT BEDSIDE.UPDATE ON PTS PRESENT CONDITION.QUESTIONS ANSWERED.DR ORDAZ SAID TO 
TITRATE LEVOPHED DOWN SINCE CVP READING IS 10 AT THIS TIME; TO KEEP TITRATING DOWN 
LEVOPHED/MAINTAIN CVP OF 8.

GOPI RAUSCH ,PTS PRIMARY CARE NURSE MADE AWARE.

## 2019-05-28 NOTE — NUR
BREAKFAST PROVIDED, PT NEEDS TO FEED AT THIS TIME ATE 75% OF MEAL, SCHEDULED MEDICATION 
GIVEN, PT TOLERATED WELL.

## 2019-05-28 NOTE — NUR
PT IS ASLEEP IN BED, NO S/S OF DISTRESS, WOUND CARE PROVIDED, POSITION CHANGED FOR OFF LOAD 
PRESSURE.

## 2019-05-28 NOTE — NUR
BENDARYL AND SINGULAR GIVEN, DOES NOT APPEAR TO BE EFFECTIVE.

PT WAS MORE RELAXED WITH LESS MOANING AFTER ADMINISTRATION OF ATIVAN. PT REPOSITIONED AT 
THIS TIME.

## 2019-05-28 NOTE — NUR
REPORT GIVEN TO NIGHT SHIFT NURSE AT BEDSIDE FOR CONTINUE OF CARE, PT IS IN STABLE CONDITION 
AT THIS TIME.

## 2019-05-28 NOTE — NUR
PT TOLERATED PO MEDS, APPLIED TOPICAL MED/ CREAM ON RIGHT SHOULDER PER ORDER AND CHANGED 
DRESSING. MINIMAL YELLOW PUS DRAINAGE AND REDNESS AROUND SITE. PT DENIES PAIN AND APPEARS 
WITHOUT DISTRESS AT THIS TIME, PT IS NOT SCRATCHING SKIN AT THIS TIME. HOB ELEVATED TO 30 
DEG.

## 2019-05-28 NOTE — NUR
CALLED DR. BOGGS, INFORMED OF PT IN DISCOMFORT/ITCHING UPPER BODY. TO UPDATE WITH NEW 
ORDER IV BENADRYL.

## 2019-05-28 NOTE — NUR
S.T. BEDSIDE SWALLOW EVAL COMPLETED. See report for details. 

Pt presents w/ mild oral dysphagia c/b slow mastication and diffuse oral 

residue after swallows. No overt s/s aspiration, however. Pt unable to 

swallow pills whole.

Recommend:

1) Advance diet texture to mechanical soft ground diet, thin liquids okay. 

Straws okay.

2) Crush P.O. meds and mix w/ puree such as applesauce.

3) 1:1 feeder w/ aspiration precautions. 

D/w pt, RN Ko and daughter at bedside results and recommendations. 

No further tx indicated at this time as pt appears to be functioning at 

her reported baseline and is tolerating baseline diet textures. DC to [facility].



Time [id number]

## 2019-05-28 NOTE — NUR
RECEIVED BEDSIDE REPORT FROM MORNING SHIFT RNDEEJAY. 

PT IS CALM/RELAXED AT THIS TIME, OPENS EYES SPONTANEOUSLY WHEN AROUSED. PT IS ABLE TO 
RESPOND TO COMMANDS, MOANS W/ INCOMPREHENSIBLE SPEECH. 

VSS TEMP 98.8, /40, RR=24,HR =82, SATING 94%, CVP=11-13. SR ON CARDIAC MONITOR.  LUNG 
SOUNDS CLEAR ON UPPER/LOWER BILAT LOBES. RT IVONNE AT BEDSIDE TO ADMINISTER BREATHING 
TREATMENT, PT TOLERATED WELL. NO PEREZ IN PLACE, PT ANURIC. RIGHT IJ INFUSING LEVOPHED DRIP 
AT 5MCG/MIN AND NS AT 30CC/HR. LEFT AV SHUNT COVERED WITH DRESSING. 

SKIN IS NON INTACT, PT HAS VARIOUS SCAB WOUNDS ON UPPER EXTREMITY, PT HAS INTERMITTENT 
ITCHING. WOUND ON RIGHT SHOULDER DRESSING IS DRY/INTACT, REDNESS ON SURROUNDING AREA NOTED. 
NO SIGNS OF EDMA NOTED. SKIN IS WARM TO TOUCH.  HOB ELEVATED, STANDARD PRECAUTIONS, ALLERGY 
BAND TO MORPHINE.

## 2019-05-29 VITALS — SYSTOLIC BLOOD PRESSURE: 122 MMHG | DIASTOLIC BLOOD PRESSURE: 30 MMHG

## 2019-05-29 VITALS — SYSTOLIC BLOOD PRESSURE: 104 MMHG | DIASTOLIC BLOOD PRESSURE: 80 MMHG

## 2019-05-29 VITALS — SYSTOLIC BLOOD PRESSURE: 110 MMHG | DIASTOLIC BLOOD PRESSURE: 47 MMHG

## 2019-05-29 VITALS — DIASTOLIC BLOOD PRESSURE: 60 MMHG | SYSTOLIC BLOOD PRESSURE: 61 MMHG

## 2019-05-29 VITALS — DIASTOLIC BLOOD PRESSURE: 44 MMHG | SYSTOLIC BLOOD PRESSURE: 111 MMHG

## 2019-05-29 VITALS — DIASTOLIC BLOOD PRESSURE: 54 MMHG | SYSTOLIC BLOOD PRESSURE: 103 MMHG

## 2019-05-29 VITALS — DIASTOLIC BLOOD PRESSURE: 62 MMHG | SYSTOLIC BLOOD PRESSURE: 75 MMHG

## 2019-05-29 VITALS — SYSTOLIC BLOOD PRESSURE: 57 MMHG | DIASTOLIC BLOOD PRESSURE: 40 MMHG

## 2019-05-29 VITALS — DIASTOLIC BLOOD PRESSURE: 81 MMHG | SYSTOLIC BLOOD PRESSURE: 116 MMHG

## 2019-05-29 VITALS — SYSTOLIC BLOOD PRESSURE: 96 MMHG | DIASTOLIC BLOOD PRESSURE: 37 MMHG

## 2019-05-29 VITALS — SYSTOLIC BLOOD PRESSURE: 112 MMHG | DIASTOLIC BLOOD PRESSURE: 52 MMHG

## 2019-05-29 VITALS — DIASTOLIC BLOOD PRESSURE: 37 MMHG | SYSTOLIC BLOOD PRESSURE: 96 MMHG

## 2019-05-29 VITALS — DIASTOLIC BLOOD PRESSURE: 73 MMHG | SYSTOLIC BLOOD PRESSURE: 99 MMHG

## 2019-05-29 VITALS — SYSTOLIC BLOOD PRESSURE: 113 MMHG | DIASTOLIC BLOOD PRESSURE: 87 MMHG

## 2019-05-29 VITALS — SYSTOLIC BLOOD PRESSURE: 113 MMHG | DIASTOLIC BLOOD PRESSURE: 71 MMHG

## 2019-05-29 VITALS — SYSTOLIC BLOOD PRESSURE: 109 MMHG | DIASTOLIC BLOOD PRESSURE: 70 MMHG

## 2019-05-29 VITALS — SYSTOLIC BLOOD PRESSURE: 101 MMHG | DIASTOLIC BLOOD PRESSURE: 33 MMHG

## 2019-05-29 VITALS — DIASTOLIC BLOOD PRESSURE: 43 MMHG | SYSTOLIC BLOOD PRESSURE: 100 MMHG

## 2019-05-29 VITALS — SYSTOLIC BLOOD PRESSURE: 59 MMHG | DIASTOLIC BLOOD PRESSURE: 58 MMHG

## 2019-05-29 VITALS — SYSTOLIC BLOOD PRESSURE: 128 MMHG | DIASTOLIC BLOOD PRESSURE: 73 MMHG

## 2019-05-29 VITALS — DIASTOLIC BLOOD PRESSURE: 58 MMHG | SYSTOLIC BLOOD PRESSURE: 90 MMHG

## 2019-05-29 VITALS — DIASTOLIC BLOOD PRESSURE: 66 MMHG | SYSTOLIC BLOOD PRESSURE: 159 MMHG

## 2019-05-29 VITALS — DIASTOLIC BLOOD PRESSURE: 53 MMHG | SYSTOLIC BLOOD PRESSURE: 88 MMHG

## 2019-05-29 VITALS — SYSTOLIC BLOOD PRESSURE: 109 MMHG | DIASTOLIC BLOOD PRESSURE: 49 MMHG

## 2019-05-29 VITALS — DIASTOLIC BLOOD PRESSURE: 22 MMHG | SYSTOLIC BLOOD PRESSURE: 43 MMHG

## 2019-05-29 VITALS — SYSTOLIC BLOOD PRESSURE: 68 MMHG | DIASTOLIC BLOOD PRESSURE: 28 MMHG

## 2019-05-29 VITALS — SYSTOLIC BLOOD PRESSURE: 126 MMHG | DIASTOLIC BLOOD PRESSURE: 44 MMHG

## 2019-05-29 VITALS — DIASTOLIC BLOOD PRESSURE: 92 MMHG | SYSTOLIC BLOOD PRESSURE: 122 MMHG

## 2019-05-29 VITALS — SYSTOLIC BLOOD PRESSURE: 113 MMHG | DIASTOLIC BLOOD PRESSURE: 44 MMHG

## 2019-05-29 VITALS — DIASTOLIC BLOOD PRESSURE: 23 MMHG | SYSTOLIC BLOOD PRESSURE: 117 MMHG

## 2019-05-29 VITALS — SYSTOLIC BLOOD PRESSURE: 126 MMHG | DIASTOLIC BLOOD PRESSURE: 42 MMHG

## 2019-05-29 VITALS — DIASTOLIC BLOOD PRESSURE: 52 MMHG | SYSTOLIC BLOOD PRESSURE: 96 MMHG

## 2019-05-29 VITALS — DIASTOLIC BLOOD PRESSURE: 78 MMHG | SYSTOLIC BLOOD PRESSURE: 128 MMHG

## 2019-05-29 VITALS — DIASTOLIC BLOOD PRESSURE: 47 MMHG | SYSTOLIC BLOOD PRESSURE: 93 MMHG

## 2019-05-29 VITALS — DIASTOLIC BLOOD PRESSURE: 45 MMHG | SYSTOLIC BLOOD PRESSURE: 119 MMHG

## 2019-05-29 VITALS — DIASTOLIC BLOOD PRESSURE: 83 MMHG | SYSTOLIC BLOOD PRESSURE: 139 MMHG

## 2019-05-29 VITALS — SYSTOLIC BLOOD PRESSURE: 72 MMHG | DIASTOLIC BLOOD PRESSURE: 31 MMHG

## 2019-05-29 VITALS — SYSTOLIC BLOOD PRESSURE: 116 MMHG | DIASTOLIC BLOOD PRESSURE: 42 MMHG

## 2019-05-29 VITALS — DIASTOLIC BLOOD PRESSURE: 48 MMHG | SYSTOLIC BLOOD PRESSURE: 84 MMHG

## 2019-05-29 VITALS — DIASTOLIC BLOOD PRESSURE: 39 MMHG | SYSTOLIC BLOOD PRESSURE: 101 MMHG

## 2019-05-29 VITALS — SYSTOLIC BLOOD PRESSURE: 109 MMHG | DIASTOLIC BLOOD PRESSURE: 84 MMHG

## 2019-05-29 VITALS — SYSTOLIC BLOOD PRESSURE: 97 MMHG | DIASTOLIC BLOOD PRESSURE: 47 MMHG

## 2019-05-29 VITALS — DIASTOLIC BLOOD PRESSURE: 68 MMHG | SYSTOLIC BLOOD PRESSURE: 93 MMHG

## 2019-05-29 VITALS — DIASTOLIC BLOOD PRESSURE: 43 MMHG | SYSTOLIC BLOOD PRESSURE: 59 MMHG

## 2019-05-29 VITALS — DIASTOLIC BLOOD PRESSURE: 69 MMHG | SYSTOLIC BLOOD PRESSURE: 105 MMHG

## 2019-05-29 VITALS — SYSTOLIC BLOOD PRESSURE: 104 MMHG | DIASTOLIC BLOOD PRESSURE: 69 MMHG

## 2019-05-29 VITALS — DIASTOLIC BLOOD PRESSURE: 90 MMHG | SYSTOLIC BLOOD PRESSURE: 117 MMHG

## 2019-05-29 VITALS — DIASTOLIC BLOOD PRESSURE: 53 MMHG | SYSTOLIC BLOOD PRESSURE: 101 MMHG

## 2019-05-29 VITALS — SYSTOLIC BLOOD PRESSURE: 116 MMHG | DIASTOLIC BLOOD PRESSURE: 68 MMHG

## 2019-05-29 VITALS — SYSTOLIC BLOOD PRESSURE: 135 MMHG | DIASTOLIC BLOOD PRESSURE: 34 MMHG

## 2019-05-29 VITALS — SYSTOLIC BLOOD PRESSURE: 99 MMHG | DIASTOLIC BLOOD PRESSURE: 62 MMHG

## 2019-05-29 VITALS — SYSTOLIC BLOOD PRESSURE: 102 MMHG | DIASTOLIC BLOOD PRESSURE: 54 MMHG

## 2019-05-29 VITALS — DIASTOLIC BLOOD PRESSURE: 66 MMHG | SYSTOLIC BLOOD PRESSURE: 104 MMHG

## 2019-05-29 VITALS — SYSTOLIC BLOOD PRESSURE: 119 MMHG | DIASTOLIC BLOOD PRESSURE: 81 MMHG

## 2019-05-29 VITALS — SYSTOLIC BLOOD PRESSURE: 116 MMHG | DIASTOLIC BLOOD PRESSURE: 73 MMHG

## 2019-05-29 VITALS — SYSTOLIC BLOOD PRESSURE: 116 MMHG | DIASTOLIC BLOOD PRESSURE: 59 MMHG

## 2019-05-29 LAB
ANION GAP SERPL CALCULATED.3IONS-SCNC: 19.2 MMOL/L (ref 8–16)
BUN SERPL-MCNC: 53 MG/DL (ref 7–18)
CHLORIDE SERPL-SCNC: 98 MMOL/L (ref 98–107)
CO2 SERPL-SCNC: 22.2 MMOL/L (ref 21–32)
CREAT SERPL-MCNC: 5.2 MG/DL (ref 0.6–1.3)
ERYTHROCYTE [DISTWIDTH] IN BLOOD BY AUTOMATED COUNT: 17.1 % (ref 11.6–13.7)
GFR SERPL CREATININE-BSD FRML MDRD: 11 ML/MIN (ref 90–?)
GLUCOSE SERPL-MCNC: 270 MG/DL (ref 74–106)
HAV IGM SERPL QL IA: NEGATIVE
HBV CORE AB SERPL QL IA: NEGATIVE
HBV SURFACE AB SER RIA-ACNC: NON REACTIVE
HBV SURFACE AG SERPL QL IA: NEGATIVE
HCT VFR BLD AUTO: 36.9 % (ref 36–48)
HGB BLD-MCNC: 11.4 G/DL (ref 12–16)
LYMPHOCYTES NFR BLD MANUAL: 6 % (ref 20–46)
MAGNESIUM SERPL-MCNC: 2.3 MG/DL (ref 1.8–2.4)
MCH RBC QN AUTO: 27 PG (ref 27–31)
MCHC RBC AUTO-ENTMCNC: 31 G/DL (ref 33–37)
MCV RBC AUTO: 87.3 FL (ref 80–94)
MONOCYTES NFR BLD MANUAL: 2 % (ref 5–12)
NRBC BLD MANUAL-RTO: 1 /100WBC (ref 0–0)
PHOSPHATE SERPL-MCNC: 5.2 MG/DL (ref 2.5–4.9)
PLATELET # BLD AUTO: 406 K/UL (ref 140–450)
POTASSIUM SERPL-SCNC: 5.4 MMOL/L (ref 3.5–5.1)
RBC # BLD AUTO: 4.22 MIL/UL (ref 4.2–5.4)
SODIUM SERPL-SCNC: 134 MMOL/L (ref 136–145)
WBC # BLD AUTO: 12.9 K/UL (ref 4.8–10.8)

## 2019-05-29 PROCEDURE — 5A1D70Z PERFORMANCE OF URINARY FILTRATION, INTERMITTENT, LESS THAN 6 HOURS PER DAY: ICD-10-PCS | Performed by: FAMILY MEDICINE

## 2019-05-29 RX ADMIN — INSULIN LISPRO SCH UNITS: 100 INJECTION, SOLUTION INTRAVENOUS; SUBCUTANEOUS at 16:43

## 2019-05-29 RX ADMIN — IPRATROPIUM BROMIDE AND ALBUTEROL SULFATE SCH ML: .5; 3 SOLUTION RESPIRATORY (INHALATION) at 15:10

## 2019-05-29 RX ADMIN — MIDODRINE HYDROCHLORIDE SCH MG: 5 TABLET ORAL at 07:13

## 2019-05-29 RX ADMIN — Medication SCH DEV: at 11:50

## 2019-05-29 RX ADMIN — SEVELAMER CARBONATE SCH MG: 800 TABLET, FILM COATED ORAL at 11:59

## 2019-05-29 RX ADMIN — Medication SCH MG: at 11:59

## 2019-05-29 RX ADMIN — FAMOTIDINE SCH MG: 20 TABLET ORAL at 08:40

## 2019-05-29 RX ADMIN — MONTELUKAST SCH MG: 10 TABLET, FILM COATED ORAL at 16:48

## 2019-05-29 RX ADMIN — INSULIN LISPRO PRN UNITS: 100 INJECTION, SOLUTION INTRAVENOUS; SUBCUTANEOUS at 07:53

## 2019-05-29 RX ADMIN — Medication SCH ML: at 20:28

## 2019-05-29 RX ADMIN — INSULIN LISPRO SCH UNITS: 100 INJECTION, SOLUTION INTRAVENOUS; SUBCUTANEOUS at 07:52

## 2019-05-29 RX ADMIN — Medication SCH MG: at 16:47

## 2019-05-29 RX ADMIN — PIPERACILLIN SODIUM AND TAZOBACTAM SODIUM SCH MLS/HR: 2; .25 INJECTION, SOLUTION INTRAVENOUS at 12:02

## 2019-05-29 RX ADMIN — HYDROCORTISONE SODIUM SUCCINATE SCH MG: 100 INJECTION, POWDER, FOR SOLUTION INTRAMUSCULAR; INTRAVENOUS at 12:01

## 2019-05-29 RX ADMIN — SEVELAMER CARBONATE SCH MG: 800 TABLET, FILM COATED ORAL at 16:47

## 2019-05-29 RX ADMIN — Medication SCH DEV: at 16:42

## 2019-05-29 RX ADMIN — Medication SCH MG: at 07:18

## 2019-05-29 RX ADMIN — MIDODRINE HYDROCHLORIDE SCH MG: 5 TABLET ORAL at 12:02

## 2019-05-29 RX ADMIN — SODIUM CHLORIDE SCH MLS/HR: 9 INJECTION, SOLUTION INTRAVENOUS at 18:09

## 2019-05-29 RX ADMIN — Medication SCH MG: at 08:39

## 2019-05-29 RX ADMIN — MUPIROCIN SCH GM: 20 OINTMENT TOPICAL at 20:33

## 2019-05-29 RX ADMIN — IPRATROPIUM BROMIDE AND ALBUTEROL SULFATE SCH ML: .5; 3 SOLUTION RESPIRATORY (INHALATION) at 21:31

## 2019-05-29 RX ADMIN — INSULIN LISPRO PRN UNITS: 100 INJECTION, SOLUTION INTRAVENOUS; SUBCUTANEOUS at 16:43

## 2019-05-29 RX ADMIN — MIDODRINE HYDROCHLORIDE SCH MG: 5 TABLET ORAL at 20:27

## 2019-05-29 RX ADMIN — Medication SCH DEV: at 07:14

## 2019-05-29 RX ADMIN — Medication SCH ML: at 16:48

## 2019-05-29 RX ADMIN — MIRTAZAPINE SCH MG: 15 TABLET, FILM COATED ORAL at 20:26

## 2019-05-29 RX ADMIN — INSULIN LISPRO PRN UNITS: 100 INJECTION, SOLUTION INTRAVENOUS; SUBCUTANEOUS at 20:55

## 2019-05-29 RX ADMIN — PIPERACILLIN SODIUM AND TAZOBACTAM SODIUM SCH MLS/HR: 2; .25 INJECTION, SOLUTION INTRAVENOUS at 05:55

## 2019-05-29 RX ADMIN — IPRATROPIUM BROMIDE AND ALBUTEROL SULFATE SCH ML: .5; 3 SOLUTION RESPIRATORY (INHALATION) at 07:21

## 2019-05-29 RX ADMIN — Medication SCH DEV: at 20:56

## 2019-05-29 RX ADMIN — HYDROCORTISONE SODIUM SUCCINATE SCH MG: 100 INJECTION, POWDER, FOR SOLUTION INTRAMUSCULAR; INTRAVENOUS at 05:56

## 2019-05-29 RX ADMIN — Medication SCH TAB: at 20:27

## 2019-05-29 RX ADMIN — MUPIROCIN SCH GM: 20 OINTMENT TOPICAL at 08:32

## 2019-05-29 RX ADMIN — Medication SCH ML: at 08:47

## 2019-05-29 RX ADMIN — Medication SCH ML: at 12:03

## 2019-05-29 RX ADMIN — PIPERACILLIN SODIUM AND TAZOBACTAM SODIUM SCH MLS/HR: 2; .25 INJECTION, SOLUTION INTRAVENOUS at 20:56

## 2019-05-29 RX ADMIN — INSULIN LISPRO PRN UNITS: 100 INJECTION, SOLUTION INTRAVENOUS; SUBCUTANEOUS at 12:01

## 2019-05-29 RX ADMIN — HYDROCORTISONE SODIUM SUCCINATE SCH MG: 100 INJECTION, POWDER, FOR SOLUTION INTRAMUSCULAR; INTRAVENOUS at 20:27

## 2019-05-29 RX ADMIN — INSULIN LISPRO SCH UNITS: 100 INJECTION, SOLUTION INTRAVENOUS; SUBCUTANEOUS at 12:00

## 2019-05-29 RX ADMIN — SEVELAMER CARBONATE SCH MG: 800 TABLET, FILM COATED ORAL at 08:38

## 2019-05-29 NOTE — NUR
RECEIVED REPORT FROM NIGHT SHIFT NURSE AT BEDSIDE, PT IS AWAKE, UNABLE TO FOLLOW COMMANDS,  
VSS, FLACC 0, NO S/S OF DISTRESS, CLEAR LUNG SOUNDS YURIY, ON RA, O2 SAT AT 96%, SR WITH PVC 
ON CARDIAC MONITOR, SOFT ABDOMEN WITH ACTIVE BOWEL SOUNDS, ANURIA, HD MWF, AV SHUNT TO LEFT 
UPPER ARM,  SKIN IS WARM AND DRY TO TOUCH, MULTIPLE OPEN WOUNDS WITH SCABS NOTED,  RIGHT 
SIDE WEAKNESS NOTED, CENTRAL LINE TO RIJ, TLC, PATENT, RUNNING LEVOPHED AT 2MCG/MIN, AND NS 
AT 30ML/HR.  HOB ELEVATED TO 30 DEGREES, SAFETY MEASURES IN PLACE, POSITION CHANGED FOR OFF 
LOAD PRESSURE, WILL CONTINUE TO MONITOR.

## 2019-05-29 NOTE — NUR
ENDORSED TO DR. ORDAZ CRITICAL LAB OF CREATININE 5.2 AND CURRENT LEVOPHED DRIP AT 2MCG/MIN. 
INFORMED 5AM DOSE OF MIDORINE NOT GIVEN, ONLY 5MG AVAILBE IN ICU, HOUSE SOUP YONATHAN 
UNABLE TO OVERRIDE AND PULL THE MED. WILL ENDORSE TO MORNING SHIFT AND PHARM ANOTHER 5MG 
NEEDED TO FULFILL 0500 MIDODRINE DOSE OF 10MG. PT BP AT THIS TIME /73.

## 2019-05-29 NOTE — NUR
PT IS AWAKE IN BED, ALERT ONLY TO HER NAME. PATIENT IS NONVERBAL. AFEBRILE-98.4, HR-75 O2 
SAT-99% ON ROOM AIR, /59, RR 24. PERRL 3 MM BRISK, SKIN IS DRY, MOTTLED, MULTIPLE 
SCABS/WOUNDS-CELLULITIS AND GANGRENE TO FINGER ON LT HAND. RIGHT SHOULDER HAS LARGE 
WOUND/SCAB. PT HAS RIGHT WRIST PERIPHERAL IV,22G WITH NS RUNNING AT 30 MLS/HR. IV PATENT, 
FLUSHED, DRESSING DRY INTACT. LUNG SOUNDS CLEAR, S1 S2, SR ON MONITOR, BUE BLE PULSES 2+, 
CAP REFILL <3 SEC, BOWEL SOUNDS ACTIVE, ABDOMEN SOFT AND ROUND, NONTENDER. NO EDEMA. BED IS 
IN LOWEST POSITION, SIDERAILS UP, CALL LIGHT WITHIN REACH. PT IS IN NO DISTRESS AT THIS 
TIME.  

-------------------------------------------------------------------------------

Addendum: 05/30/19 at 0149 by Lexii Medellin RN

-------------------------------------------------------------------------------

TIME OF ASSESSMENT IS 1930 NOT 1730

## 2019-05-29 NOTE — NUR
PT PULLED OUT THE CENTRAL LINE ON RIGHT IJ, MAXIMUM BLEEDING NOTED, PRESSURE PLACED, 
BLEEDING STOPPED, DR. RODRIGUEZ MADE AWARE AND CAME TO ROOM CHECKING PT. PERIPHERAL LINE STARTED 
ON RIGHT WRIST, 22GA, RESUMED THE LEVOPHED DUE TO PT'S BP DROPPING TO 63/28. CLEANED PT AND 
MITTS PLACED ON YURIY HAND, PLACED PT ON COMFORT POSITION, WILL CONTINUE TO MONITOR.

## 2019-05-29 NOTE — NUR
REPORT GIVEN TO NIGHT SHIFT NURSE AT BEDSIDE FOR CONTINUE OF CARE. PT IS IN STABLE CONDITION 
AT THIS TIME.

## 2019-05-29 NOTE — NUR
AM CARES PROVIDED, SMALL, BROWN/SOFT BM, FRESH GOWN AND LINENS PROVIDED TO PATIENT. NEW 
DRESSING APPLIED TO RIGHT SHOULDER WOUND. PT REPOSITIONED MAX ASSIST X2, PT TOLERATED WELL. 
PT SLEEPING AND APPEARS TO BE WITHOUT DISTRESS, DENIES PAIN AT THIS TIME.

## 2019-05-29 NOTE — NUR
DR. ORDAZ IN TO SEE PATIENT, STATED TO MONITOR BP BASED ON CVP, INFORMED CVP OF 17, TITRATE 
DOWN TO 3MCG/MIN.

## 2019-05-29 NOTE — NUR
0500 MIDORINE NOT GIVEN, ONLY HALF DOSE AVAILABLE IN ICU. INFORMED HOUSE MARIO ALBERTO MCMANUS, WHO 
WAS UNABLE TO OVERRIDE AND PULL THE MED. PT IS CURRENTLY ON 4MCG/MIN LEVOPHED VSS.

## 2019-05-29 NOTE — NUR
NO S/S OF DISTRESS, VSS, FLACC 0, POSITION CHANGED FOR OFF LOAD PRESSURE, DINNER FEED, PT 
% OF MEAL AT THIS TIME.

## 2019-05-29 NOTE — NUR
RECEIVED REPORT AT BEDSIDE FROM NIGHTSHIFT NURSE PER, DIALYSIS NURSE AT BEDSIDE FOR 
HEMODIALYSIS. PT IN NO DISTRESS AT THIS TIME.

## 2019-05-29 NOTE — NUR
RECEIVED PATIENT ON ROOM AIR, PULSE OX SAT 99%. SCHEDULED BREATHING TREATMENT ADMINISTERED. 
TOLERATED TX WELL, NO ADVERSE SIDE EFFECTS. NO RESPIRATORY DISTRESS NOTED. WILL CONTINUE TO 
MONITOR.

## 2019-05-29 NOTE — NUR
WOUND CARE EVALUATION NOTE:

REASON FOR EVALUATION: LOW MATTHIAS SCALE AND RIGHT SHOULDER WOUND

SKIN ASSESSMENT DONE WITH THIS 58 Y/O FEMALE PT ADMITTED FROM [facility] TO [facility] WITH 
INITIAL DX IRRITABLE.  PAST MEDICAL HX INCLUDES HTN, DM, S/P CVA, DYSPHAGIA AND APHASIA, 
ESRD, PAD, COPD, GERD AND TIA. ALL ABOVE INFORMATION OBTAINED FROM ADMISSION H&P. PT IS 
AWAKE. SKIN IS COLD AND DRY, BLE HAIR GROWTH, NO EDEMA.  DORSAL PEDAL PULSES PRESENT AND 
NORMAL. CAPILLARY REFILLED < 2 SEC. X 10 TOES. PER CHARGE NURSE THAT DAUGHTER MENTIONED THE 
PT HAS FOLLOW UP OUTPATIENT APPOINTMENT FOR GANGRENE FINGER. PLAN OF CARE DISCUSSED WITH 
PRIMARY RN. 



INTEGUMENTARY:

-LEFT 5TH DIGIT FINGER BLACK GANGRENE

-LEFT NECK UNDER CHIN AND RIGHT SHOULDER ERYTHEMA, SWELLING AND SLIGHTLY WARM TO TOUCH 
COMPARE TO HANDS

-UNDER BREAST MOIST

-ABDOMEN DISTENDED, ABDOMINAL FOLDS MOIST

-MULTIPLE DRY SCABS FROM CHRONIC LESIONS TO CHEST, BACK, UPPER AND LOWER EXTREMITIES

-RIGHT HEEL BLANCHABLE REDNESS, MUSHY WITH SKIN INTACT 



RECOMMENDATIONS:

-SURGEON CONSULT PENDING

-PAINT MULTIPLE DRY SCABS FROM CHRONIC LESIONS TO CHEST, BACK, UPPER AND LOWER EXTREMITIES 
WITH BETADINE SOLUTIONS BID AND LEAVE IT OPEN TO AIR

-APPLY INTER DRY CLOTH TO LOWER ABDOMINAL FOLD AND UNDER BREASTS Q 7 DAYS AND PRN IF SOILING 
AS PREVENTION

-APPLY FORM DRESSING TO RIGHT HEEL Q7 DAYS AND PRN IF SOILING AS PREVENTION

-APPLY HEEL PROTECTORS TO BOTH HEELS AT ALL TIMES

-OFFLOAD BILATERAL HEELS BY PLACING PILLOWS UNDER CALVES UNLESS OTHERWISE CONTRAINDICATED

-PRESSURE REDISTRIBUTION SURFACE THERAPY

-TURN AND REPOSITION Q2H, OFFLOAD SACRALCOCCYX AND BUTTOCKS BY TURNING RIGHT AND LEFT

-CONTINUE TO FOLLOW RD RECOMMENDATIONS

ALL ABOVE RECOMMENDATIONS DISCUSSED WITH PRIMARY RN.

WILL FOLLOW UP PT Q7-10 DAYS. PLEASE CONTACT WOUND CARE NURSE FOR ANY QUESTION AND CHANGE OF 
WOUND CONDITION.

## 2019-05-29 NOTE — NUR
PT'S DAUGHTER CALLED, INFORMED HER ABOUT THE EVENT OF CENTRAL LINE, CURRENT PT'S CONDITION 
STABLE, VSS.

## 2019-05-30 VITALS — SYSTOLIC BLOOD PRESSURE: 156 MMHG | DIASTOLIC BLOOD PRESSURE: 78 MMHG

## 2019-05-30 VITALS — DIASTOLIC BLOOD PRESSURE: 78 MMHG | SYSTOLIC BLOOD PRESSURE: 144 MMHG

## 2019-05-30 VITALS — SYSTOLIC BLOOD PRESSURE: 166 MMHG | DIASTOLIC BLOOD PRESSURE: 74 MMHG

## 2019-05-30 VITALS — DIASTOLIC BLOOD PRESSURE: 87 MMHG | SYSTOLIC BLOOD PRESSURE: 141 MMHG

## 2019-05-30 VITALS — SYSTOLIC BLOOD PRESSURE: 140 MMHG | DIASTOLIC BLOOD PRESSURE: 71 MMHG

## 2019-05-30 VITALS — DIASTOLIC BLOOD PRESSURE: 70 MMHG | SYSTOLIC BLOOD PRESSURE: 125 MMHG

## 2019-05-30 VITALS — SYSTOLIC BLOOD PRESSURE: 123 MMHG | DIASTOLIC BLOOD PRESSURE: 81 MMHG

## 2019-05-30 LAB
ANION GAP SERPL CALCULATED.3IONS-SCNC: 16.9 MMOL/L (ref 8–16)
BASOPHILS # BLD AUTO: 0 K/UL (ref 0–0.22)
BASOPHILS NFR BLD AUTO: 0.2 % (ref 0–2)
BUN SERPL-MCNC: 37 MG/DL (ref 7–18)
CHLORIDE SERPL-SCNC: 103 MMOL/L (ref 98–107)
CO2 SERPL-SCNC: 23.2 MMOL/L (ref 21–32)
CREAT SERPL-MCNC: 4 MG/DL (ref 0.6–1.3)
EOSINOPHIL # BLD AUTO: 0 K/UL (ref 0–0.4)
EOSINOPHIL NFR BLD AUTO: 0.1 % (ref 0–4)
ERYTHROCYTE [DISTWIDTH] IN BLOOD BY AUTOMATED COUNT: 17.3 % (ref 11.6–13.7)
GFR SERPL CREATININE-BSD FRML MDRD: 15 ML/MIN (ref 90–?)
GLUCOSE SERPL-MCNC: 259 MG/DL (ref 74–106)
HCT VFR BLD AUTO: 33.2 % (ref 36–48)
HGB BLD-MCNC: 10.4 G/DL (ref 12–16)
LYMPHOCYTES # BLD AUTO: 0.5 K/UL (ref 2.5–16.5)
LYMPHOCYTES NFR BLD AUTO: 6.7 % (ref 20.5–51.1)
MAGNESIUM SERPL-MCNC: 2.2 MG/DL (ref 1.8–2.4)
MCH RBC QN AUTO: 28 PG (ref 27–31)
MCHC RBC AUTO-ENTMCNC: 31 G/DL (ref 33–37)
MCV RBC AUTO: 88.1 FL (ref 80–94)
MONOCYTES # BLD AUTO: 0.3 K/UL (ref 0.8–1)
MONOCYTES NFR BLD AUTO: 4 % (ref 1.7–9.3)
NEUTROPHILS # BLD AUTO: 7.2 K/UL (ref 1.8–7.7)
NEUTROPHILS NFR BLD AUTO: 89 % (ref 42.2–75.2)
PHOSPHATE SERPL-MCNC: 4.9 MG/DL (ref 2.5–4.9)
PLATELET # BLD AUTO: 329 K/UL (ref 140–450)
POTASSIUM SERPL-SCNC: 4.1 MMOL/L (ref 3.5–5.1)
RBC # BLD AUTO: 3.77 MIL/UL (ref 4.2–5.4)
SODIUM SERPL-SCNC: 139 MMOL/L (ref 136–145)
WBC # BLD AUTO: 8.1 K/UL (ref 4.8–10.8)

## 2019-05-30 PROCEDURE — 0JB50ZZ EXCISION OF LEFT NECK SUBCUTANEOUS TISSUE AND FASCIA, OPEN APPROACH: ICD-10-PCS | Performed by: SURGERY

## 2019-05-30 PROCEDURE — 0JBD0ZZ EXCISION OF RIGHT UPPER ARM SUBCUTANEOUS TISSUE AND FASCIA, OPEN APPROACH: ICD-10-PCS | Performed by: SURGERY

## 2019-05-30 PROCEDURE — 0JB10ZZ EXCISION OF FACE SUBCUTANEOUS TISSUE AND FASCIA, OPEN APPROACH: ICD-10-PCS | Performed by: SURGERY

## 2019-05-30 RX ADMIN — Medication SCH ML: at 22:33

## 2019-05-30 RX ADMIN — MUPIROCIN SCH GM: 20 OINTMENT TOPICAL at 22:51

## 2019-05-30 RX ADMIN — INSULIN LISPRO SCH UNITS: 100 INJECTION, SOLUTION INTRAVENOUS; SUBCUTANEOUS at 17:14

## 2019-05-30 RX ADMIN — HYDROCORTISONE SODIUM SUCCINATE SCH MG: 100 INJECTION, POWDER, FOR SOLUTION INTRAMUSCULAR; INTRAVENOUS at 13:23

## 2019-05-30 RX ADMIN — Medication SCH DEV: at 08:18

## 2019-05-30 RX ADMIN — HYDROCORTISONE SODIUM SUCCINATE SCH MG: 100 INJECTION, POWDER, FOR SOLUTION INTRAMUSCULAR; INTRAVENOUS at 05:52

## 2019-05-30 RX ADMIN — SEVELAMER CARBONATE SCH MG: 800 TABLET, FILM COATED ORAL at 08:00

## 2019-05-30 RX ADMIN — MIRTAZAPINE SCH MG: 15 TABLET, FILM COATED ORAL at 21:00

## 2019-05-30 RX ADMIN — Medication SCH ML: at 16:42

## 2019-05-30 RX ADMIN — IPRATROPIUM BROMIDE AND ALBUTEROL SULFATE SCH ML: .5; 3 SOLUTION RESPIRATORY (INHALATION) at 07:36

## 2019-05-30 RX ADMIN — PIPERACILLIN SODIUM AND TAZOBACTAM SODIUM SCH MLS/HR: 2; .25 INJECTION, SOLUTION INTRAVENOUS at 05:56

## 2019-05-30 RX ADMIN — SEVELAMER CARBONATE SCH MG: 800 TABLET, FILM COATED ORAL at 16:41

## 2019-05-30 RX ADMIN — Medication SCH DEV: at 12:06

## 2019-05-30 RX ADMIN — HYDROCORTISONE SODIUM SUCCINATE SCH MG: 100 INJECTION, POWDER, FOR SOLUTION INTRAMUSCULAR; INTRAVENOUS at 22:51

## 2019-05-30 RX ADMIN — Medication SCH MG: at 11:30

## 2019-05-30 RX ADMIN — Medication SCH ML: at 13:20

## 2019-05-30 RX ADMIN — PIPERACILLIN SODIUM AND TAZOBACTAM SODIUM SCH MLS/HR: 2; .25 INJECTION, SOLUTION INTRAVENOUS at 22:32

## 2019-05-30 RX ADMIN — FAMOTIDINE SCH MG: 20 TABLET ORAL at 08:21

## 2019-05-30 RX ADMIN — MUPIROCIN SCH GM: 20 OINTMENT TOPICAL at 09:00

## 2019-05-30 RX ADMIN — MIDODRINE HYDROCHLORIDE SCH MG: 5 TABLET ORAL at 13:00

## 2019-05-30 RX ADMIN — INSULIN LISPRO SCH UNITS: 100 INJECTION, SOLUTION INTRAVENOUS; SUBCUTANEOUS at 11:30

## 2019-05-30 RX ADMIN — INSULIN LISPRO SCH UNITS: 100 INJECTION, SOLUTION INTRAVENOUS; SUBCUTANEOUS at 08:27

## 2019-05-30 RX ADMIN — SEVELAMER CARBONATE SCH MG: 800 TABLET, FILM COATED ORAL at 12:00

## 2019-05-30 RX ADMIN — PIPERACILLIN SODIUM AND TAZOBACTAM SODIUM SCH MLS/HR: 2; .25 INJECTION, SOLUTION INTRAVENOUS at 13:23

## 2019-05-30 RX ADMIN — MIDODRINE HYDROCHLORIDE SCH MG: 5 TABLET ORAL at 21:00

## 2019-05-30 RX ADMIN — IPRATROPIUM BROMIDE AND ALBUTEROL SULFATE SCH ML: .5; 3 SOLUTION RESPIRATORY (INHALATION) at 13:05

## 2019-05-30 RX ADMIN — SODIUM CHLORIDE SCH MLS/HR: 9 INJECTION, SOLUTION INTRAVENOUS at 17:08

## 2019-05-30 RX ADMIN — Medication SCH TAB: at 21:00

## 2019-05-30 RX ADMIN — IPRATROPIUM BROMIDE AND ALBUTEROL SULFATE SCH ML: .5; 3 SOLUTION RESPIRATORY (INHALATION) at 20:12

## 2019-05-30 RX ADMIN — Medication SCH MG: at 06:59

## 2019-05-30 RX ADMIN — Medication SCH MG: at 08:20

## 2019-05-30 RX ADMIN — Medication SCH DEV: at 21:00

## 2019-05-30 RX ADMIN — DEXTROSE AND SODIUM CHLORIDE SCH MLS/HR: 5; .45 INJECTION, SOLUTION INTRAVENOUS at 10:52

## 2019-05-30 RX ADMIN — Medication SCH MG: at 16:41

## 2019-05-30 RX ADMIN — Medication SCH ML: at 09:28

## 2019-05-30 RX ADMIN — MIDODRINE HYDROCHLORIDE SCH MG: 5 TABLET ORAL at 05:52

## 2019-05-30 RX ADMIN — MONTELUKAST SCH MG: 10 TABLET, FILM COATED ORAL at 16:41

## 2019-05-30 RX ADMIN — Medication SCH DEV: at 16:52

## 2019-05-30 NOTE — NUR
GOT REPORT FROM MIRACLE JOE. PATIENT TRANSFERRED FROM ICU AND ON TELE MONITOR . CONTACT 
PRECAUTIONS IN PLACE FOR MRSA (+) OF SHAHLA ABSCESS. PATIENT ON ROOM AIR AND NO DISTRESS NOTED. 
PATIENT AAOX1 TO NAME AND IS ON BEDREST. FALL RISK PROTOCOL IN PLACE. L PINKY GANGRENE, 
SHOULDER AND NECK ABSCESS, AND GENERALIZED SCARRING. IV ON R WRIST 22G INFUSING NS AT 30, IV 
ASYMPTOMATIC PATENT AND INTACT. L UA AV SHUNT, SIGN POSTED NO BP/VENIPUNCTURE ON L ARM. BED 
IN LOW POSITION, CALL LIGHT WITHIN REACH, SIDE RAILS X2 UP

## 2019-05-30 NOTE — NUR
5/30/19 RD FOLLOW UP COMPLETED



PLEASE REFER TO NUTRITION ASSESSMENT UNDER CARE ACTIVITY FOR ESTIMATED NUTRITIONAL NEEDS. 



1. CONTINUE NPO AS MEDICALLY APPROPRIATE 

2. WHEN PT IS MEDICALLY STABLE CONTINUE RENAL MECHANICALLY SOFT DIET WITH NEPRO BID

3. RD TO FOLLOW-UP 2-3 DAYS, HIGH RISK 



KEVIN NIEVES, RD

## 2019-05-30 NOTE — NUR
RECEIVED BEDSIDE REPORT FROM DAY SHIFT RN. PATIENT IS AAOX1, UNABLE TO MAKE NEEDS KNOWN OR 
FOLLOWS COMMANDS. NO S/S OF SOB OR RESPIRATORY DISTRESS NOTED ON ROOM AIR. PT HAS MULTIPLE 
SCABS THROUGHOUT CHEST. IV SITE ON RIGHT WRIST,22G,  ASYMPTOMATIC AND PATENT. FLACC 0. 
PATIENT IS NPO SINCE MIDNIGHT, CALL LIGHT WITHIN REACH, WILL CONTINUE TO MONITOR.

## 2019-05-30 NOTE — NUR
RECEIVED BEDSIDE REPORT FROM NIGHT SHIFT RN, JAVON, FOR CONTINUITY OF CARE. PATIENT IS 
AAOX1, UNABLE TO MAKE NEEDS KNOWN OR FOLLOWS COMMANDS. PATIENT SKIN IS NOT INTACT, SHE HAS 
MULTIPLE SCABS THROUGHOUT CHEST. PATIENT HAS A PERIPHERAL IV SITE TO R. WRIST, ASYMPTOMATIC 
AND PATENT. PATIENT IS ON ROOM AIR, BREATHING EVEN AND UNLABORED. HYPERTENSIVE, FLACC 0. 
PATIENT IS NPO SINCE MIDNIGHT, NO SIGNS OF DISTRESS AT THIS TIME. CALL LIGHT WITHIN REACH, 
WILL CONTINUE TO MONITOR.

## 2019-05-30 NOTE — NUR
REPOSITIONED PT, PROVIDED BED BATH PER BM AND SCRATCHED LEFT SIDED SCAB ON NECK. REPLACED 
DRESSING.

## 2019-05-30 NOTE — NUR
PT COMES BACK FROM SURGERY. PT SLEEPING AND IN STABLE CONDITION. VS CHECKED. WITHIN NORMAL 
RANGE. WILL CONTINUE TO MONITOR. 

-------------------------------------------------------------------------------

Addendum: 05/31/19 at 0743 by Adalgisa Acevedo RN

-------------------------------------------------------------------------------

BS CHECKED. 223. HELD INSULIN D/T PT IN NPO.

## 2019-05-30 NOTE — NUR
VS CHECKED, /25 NOTED. NOTIFIED RESIDENT  GIVEN 500ML NS BOLUS AS DR ORDERED. WILL 
CONTINUE TO MONITOR.

## 2019-05-31 VITALS — DIASTOLIC BLOOD PRESSURE: 54 MMHG | SYSTOLIC BLOOD PRESSURE: 112 MMHG

## 2019-05-31 VITALS — DIASTOLIC BLOOD PRESSURE: 51 MMHG | SYSTOLIC BLOOD PRESSURE: 120 MMHG

## 2019-05-31 VITALS — SYSTOLIC BLOOD PRESSURE: 109 MMHG | DIASTOLIC BLOOD PRESSURE: 46 MMHG

## 2019-05-31 VITALS — DIASTOLIC BLOOD PRESSURE: 38 MMHG | SYSTOLIC BLOOD PRESSURE: 133 MMHG

## 2019-05-31 VITALS — DIASTOLIC BLOOD PRESSURE: 35 MMHG | SYSTOLIC BLOOD PRESSURE: 145 MMHG

## 2019-05-31 VITALS — SYSTOLIC BLOOD PRESSURE: 94 MMHG | DIASTOLIC BLOOD PRESSURE: 41 MMHG

## 2019-05-31 LAB
ANION GAP SERPL CALCULATED.3IONS-SCNC: 20.7 MMOL/L (ref 8–16)
BASOPHILS # BLD AUTO: 0 K/UL (ref 0–0.22)
BASOPHILS NFR BLD AUTO: 0.2 % (ref 0–2)
BUN SERPL-MCNC: 49 MG/DL (ref 7–18)
CHLORIDE SERPL-SCNC: 101 MMOL/L (ref 98–107)
CO2 SERPL-SCNC: 20.3 MMOL/L (ref 21–32)
CREAT SERPL-MCNC: 5 MG/DL (ref 0.6–1.3)
EOSINOPHIL # BLD AUTO: 0.1 K/UL (ref 0–0.4)
EOSINOPHIL NFR BLD AUTO: 0.8 % (ref 0–4)
ERYTHROCYTE [DISTWIDTH] IN BLOOD BY AUTOMATED COUNT: 18 % (ref 11.6–13.7)
GFR SERPL CREATININE-BSD FRML MDRD: 11 ML/MIN (ref 90–?)
GLUCOSE SERPL-MCNC: 243 MG/DL (ref 74–106)
HCT VFR BLD AUTO: 34.6 % (ref 36–48)
HGB BLD-MCNC: 10.6 G/DL (ref 12–16)
LYMPHOCYTES # BLD AUTO: 0.5 K/UL (ref 2.5–16.5)
LYMPHOCYTES NFR BLD AUTO: 4.9 % (ref 20.5–51.1)
MAGNESIUM SERPL-MCNC: 2.3 MG/DL (ref 1.8–2.4)
MCH RBC QN AUTO: 28 PG (ref 27–31)
MCHC RBC AUTO-ENTMCNC: 31 G/DL (ref 33–37)
MCV RBC AUTO: 90.1 FL (ref 80–94)
MONOCYTES # BLD AUTO: 0.5 K/UL (ref 0.8–1)
MONOCYTES NFR BLD AUTO: 4.2 % (ref 1.7–9.3)
NEUTROPHILS # BLD AUTO: 9.8 K/UL (ref 1.8–7.7)
NEUTROPHILS NFR BLD AUTO: 89.9 % (ref 42.2–75.2)
PHOSPHATE SERPL-MCNC: 7.4 MG/DL (ref 2.5–4.9)
PLATELET # BLD AUTO: 291 K/UL (ref 140–450)
POTASSIUM SERPL-SCNC: 5 MMOL/L (ref 3.5–5.1)
RBC # BLD AUTO: 3.84 MIL/UL (ref 4.2–5.4)
SODIUM SERPL-SCNC: 137 MMOL/L (ref 136–145)
WBC # BLD AUTO: 10.9 K/UL (ref 4.8–10.8)

## 2019-05-31 PROCEDURE — 5A1D70Z PERFORMANCE OF URINARY FILTRATION, INTERMITTENT, LESS THAN 6 HOURS PER DAY: ICD-10-PCS | Performed by: FAMILY MEDICINE

## 2019-05-31 RX ADMIN — SEVELAMER CARBONATE SCH MG: 800 TABLET, FILM COATED ORAL at 12:27

## 2019-05-31 RX ADMIN — Medication SCH MG: at 09:09

## 2019-05-31 RX ADMIN — INSULIN LISPRO PRN UNITS: 100 INJECTION, SOLUTION INTRAVENOUS; SUBCUTANEOUS at 22:46

## 2019-05-31 RX ADMIN — Medication SCH DEV: at 05:56

## 2019-05-31 RX ADMIN — HYDROCORTISONE SODIUM SUCCINATE SCH MG: 100 INJECTION, POWDER, FOR SOLUTION INTRAMUSCULAR; INTRAVENOUS at 04:37

## 2019-05-31 RX ADMIN — MUPIROCIN SCH GM: 20 OINTMENT TOPICAL at 09:00

## 2019-05-31 RX ADMIN — INSULIN LISPRO SCH UNITS: 100 INJECTION, SOLUTION INTRAVENOUS; SUBCUTANEOUS at 16:29

## 2019-05-31 RX ADMIN — Medication SCH ML: at 21:00

## 2019-05-31 RX ADMIN — FAMOTIDINE SCH MG: 20 TABLET ORAL at 09:09

## 2019-05-31 RX ADMIN — MIRTAZAPINE SCH MG: 15 TABLET, FILM COATED ORAL at 22:35

## 2019-05-31 RX ADMIN — SEVELAMER CARBONATE SCH MG: 800 TABLET, FILM COATED ORAL at 12:00

## 2019-05-31 RX ADMIN — INSULIN LISPRO SCH UNITS: 100 INJECTION, SOLUTION INTRAVENOUS; SUBCUTANEOUS at 12:53

## 2019-05-31 RX ADMIN — INSULIN LISPRO SCH UNITS: 100 INJECTION, SOLUTION INTRAVENOUS; SUBCUTANEOUS at 06:39

## 2019-05-31 RX ADMIN — PIPERACILLIN SODIUM AND TAZOBACTAM SODIUM SCH MLS/HR: 2; .25 INJECTION, SOLUTION INTRAVENOUS at 04:39

## 2019-05-31 RX ADMIN — Medication SCH DEV: at 21:00

## 2019-05-31 RX ADMIN — MUPIROCIN SCH GM: 20 OINTMENT TOPICAL at 21:00

## 2019-05-31 RX ADMIN — IPRATROPIUM BROMIDE AND ALBUTEROL SULFATE SCH ML: .5; 3 SOLUTION RESPIRATORY (INHALATION) at 19:25

## 2019-05-31 RX ADMIN — Medication SCH ML: at 12:41

## 2019-05-31 RX ADMIN — Medication SCH DEV: at 11:30

## 2019-05-31 RX ADMIN — MONTELUKAST SCH MG: 10 TABLET, FILM COATED ORAL at 16:14

## 2019-05-31 RX ADMIN — Medication SCH DEV: at 16:29

## 2019-05-31 RX ADMIN — IPRATROPIUM BROMIDE AND ALBUTEROL SULFATE SCH ML: .5; 3 SOLUTION RESPIRATORY (INHALATION) at 13:06

## 2019-05-31 RX ADMIN — Medication SCH ML: at 16:14

## 2019-05-31 RX ADMIN — PIPERACILLIN SODIUM AND TAZOBACTAM SODIUM SCH MLS/HR: 2; .25 INJECTION, SOLUTION INTRAVENOUS at 23:39

## 2019-05-31 RX ADMIN — DEXTROSE AND SODIUM CHLORIDE SCH MLS/HR: 5; .45 INJECTION, SOLUTION INTRAVENOUS at 10:30

## 2019-05-31 RX ADMIN — MIDODRINE HYDROCHLORIDE SCH MG: 5 TABLET ORAL at 12:44

## 2019-05-31 RX ADMIN — SODIUM CHLORIDE SCH MLS/HR: 9 INJECTION, SOLUTION INTRAVENOUS at 17:02

## 2019-05-31 RX ADMIN — IPRATROPIUM BROMIDE AND ALBUTEROL SULFATE SCH ML: .5; 3 SOLUTION RESPIRATORY (INHALATION) at 07:48

## 2019-05-31 RX ADMIN — PIPERACILLIN SODIUM AND TAZOBACTAM SODIUM SCH MLS/HR: 2; .25 INJECTION, SOLUTION INTRAVENOUS at 12:41

## 2019-05-31 RX ADMIN — Medication SCH MG: at 06:42

## 2019-05-31 RX ADMIN — HYDROCORTISONE SODIUM SUCCINATE SCH MG: 100 INJECTION, POWDER, FOR SOLUTION INTRAMUSCULAR; INTRAVENOUS at 22:23

## 2019-05-31 RX ADMIN — Medication SCH MG: at 11:30

## 2019-05-31 RX ADMIN — Medication SCH ML: at 09:11

## 2019-05-31 RX ADMIN — Medication SCH MG: at 16:13

## 2019-05-31 RX ADMIN — Medication SCH TAB: at 22:34

## 2019-05-31 RX ADMIN — MUPIROCIN SCH GM: 20 OINTMENT TOPICAL at 09:10

## 2019-05-31 RX ADMIN — MIDODRINE HYDROCHLORIDE SCH MG: 5 TABLET ORAL at 22:35

## 2019-05-31 RX ADMIN — MIDODRINE HYDROCHLORIDE SCH MG: 5 TABLET ORAL at 04:38

## 2019-05-31 RX ADMIN — SEVELAMER CARBONATE SCH MG: 800 TABLET, FILM COATED ORAL at 09:09

## 2019-05-31 RX ADMIN — HYDROCORTISONE SODIUM SUCCINATE SCH MG: 100 INJECTION, POWDER, FOR SOLUTION INTRAMUSCULAR; INTRAVENOUS at 12:27

## 2019-05-31 RX ADMIN — INSULIN LISPRO PRN UNITS: 100 INJECTION, SOLUTION INTRAVENOUS; SUBCUTANEOUS at 06:39

## 2019-05-31 RX ADMIN — MIDODRINE HYDROCHLORIDE SCH MG: 5 TABLET ORAL at 13:00

## 2019-05-31 RX ADMIN — Medication SCH MG: at 12:29

## 2019-05-31 RX ADMIN — SEVELAMER CARBONATE SCH MG: 800 TABLET, FILM COATED ORAL at 16:14

## 2019-05-31 NOTE — NUR
DID NOT REMOVE PACKING ON LEFT NECK AND HEAD INCISIONS. PACKING IS STICKING TO SKIN. APPLIED 
NS, COVERED,  AND WILL TRY TO REMOVE LATER. DID NOT ASSESS DEPTH ON THESE WOUNDS.



REMOVED AND CHANGED PACKING AND DRESSING FOR 2 RT UA INCISIONS. 



PHOTOS TAKEN OF ALL INCISIONS.

## 2019-05-31 NOTE — NUR
DIFLUCAN IV GIVEN VIA R HAND 22G. PT IN BED NO S/S OF PAIN OR DISTRESS NOTED. PT TURNED AND 
REPOSITIONED. NO S/S OF PAIN OR DISTRESS NOTED. 88.9

## 2019-05-31 NOTE — NUR
ENDORSED POC TO NIGHT SHIFT RN. HD IN PROGRESS. 

ENDORSED TO CHANGED PACKING ON LEFT NECK AND HEAD INCISIONS. 

ENDORSED TO ADMIN FLUCONAZOLE 2H AFTER HD COMPLETES.

## 2019-05-31 NOTE — NUR
PT HD COMPLETED PULSE IS 70 AND LAST B/P AFTER HD /52. 1.6 LITERS TAKEN OUT FORM HD. 
PT GIVEN DUE MEDS AT THIS TIME. FINGERSTICK . PT ALSO GIVEN HUMALOG COVERAGE. PT IV 
ZOSYN GIVEN IV SITE R HAND 22 GUAGE INTACT AND RUNNING D5 1/2 NS AT 10 TO KVO.

## 2019-05-31 NOTE — NUR
DIALYSIS IN PROGRESS AND PT TEMP 97.5 P 64. PRE B/P WAS 94/41 02 99% ON ROOM AIR. PT HAS NO 
S/S OF PAIN OR DISTRESS NOTED.

## 2019-05-31 NOTE — NUR
RECEIVED BEDSIDE REPORT FROM NIGHT SHIFT RN FOR CONTINUITY OF CARE. PT IN STABLE CONDITION. 
LETHARGIC BUT AROUSABLE BY TOUCH. NO C/O PAIN AND DISCOMFORT. NO S/S DISTRESS. RESPIRATIONS 
EVEN AND UNLABORED. HEART RHYTHM REGULAR. ACTIVE BS IN ALL QUADRANTS. SCABS THROUGHOUT. S/P 
I&D OF LEFT NECK AND RIGHT UA- DRESSINGS C/D/I. PT IS BEDBOUND. ANURIC PER NIGHT SHIFT RN. 
IV SITE PATENT AND ASYMPTOMATIC, INFUSING IVF PER MD ORDERS. ALL SAFETY PRECAUTIONS IN 
PLACE, WILL CONTINUE TO MONITOR.

## 2019-05-31 NOTE — NUR
PT IN BED RESTING WITH DIALYSIS IN PROGRESS. DIALYSIS RN VIRAL AT BEDSIDE. BEDSIDE REPORT 
RECEIVED FROM ROCHELLE RAUSCH [facility] NURSE. PT IN STABLE CONDITION.

## 2019-05-31 NOTE — NUR
NOTIFIED DR. ORDAZ THAT PATIENT REFUSED REGLAN, SEVELAMIR, MIDODRINE, ATARAX. INFORMED MD 
THAT PT'S BP WAS 91/40, PLACED IN [place], AWOKEN, AND RETOOK BP WITH 112/54 RESULT. 
NO ORDERS AT THIS TIME.

## 2019-06-01 VITALS — DIASTOLIC BLOOD PRESSURE: 72 MMHG | SYSTOLIC BLOOD PRESSURE: 123 MMHG

## 2019-06-01 VITALS — DIASTOLIC BLOOD PRESSURE: 57 MMHG | SYSTOLIC BLOOD PRESSURE: 105 MMHG

## 2019-06-01 VITALS — DIASTOLIC BLOOD PRESSURE: 43 MMHG | SYSTOLIC BLOOD PRESSURE: 110 MMHG

## 2019-06-01 VITALS — SYSTOLIC BLOOD PRESSURE: 112 MMHG | DIASTOLIC BLOOD PRESSURE: 56 MMHG

## 2019-06-01 LAB
ANION GAP SERPL CALCULATED.3IONS-SCNC: 18.4 MMOL/L (ref 8–16)
BASOPHILS # BLD AUTO: 0 K/UL (ref 0–0.22)
BASOPHILS NFR BLD AUTO: 0 % (ref 0–2)
BUN SERPL-MCNC: 32 MG/DL (ref 7–18)
CHLORIDE SERPL-SCNC: 101 MMOL/L (ref 98–107)
CO2 SERPL-SCNC: 24.3 MMOL/L (ref 21–32)
CREAT SERPL-MCNC: 3.7 MG/DL (ref 0.6–1.3)
EOSINOPHIL # BLD AUTO: 0 K/UL (ref 0–0.4)
EOSINOPHIL NFR BLD AUTO: 0 % (ref 0–4)
ERYTHROCYTE [DISTWIDTH] IN BLOOD BY AUTOMATED COUNT: 18.1 % (ref 11.6–13.7)
GFR SERPL CREATININE-BSD FRML MDRD: 16 ML/MIN (ref 90–?)
GLUCOSE SERPL-MCNC: 300 MG/DL (ref 74–106)
HCT VFR BLD AUTO: 37.2 % (ref 36–48)
HGB BLD-MCNC: 11.4 G/DL (ref 12–16)
LYMPHOCYTES # BLD AUTO: 0.5 K/UL (ref 2.5–16.5)
LYMPHOCYTES NFR BLD AUTO: 6.1 % (ref 20.5–51.1)
MAGNESIUM SERPL-MCNC: 2 MG/DL (ref 1.8–2.4)
MCH RBC QN AUTO: 28 PG (ref 27–31)
MCHC RBC AUTO-ENTMCNC: 31 G/DL (ref 33–37)
MCV RBC AUTO: 89.9 FL (ref 80–94)
MONOCYTES # BLD AUTO: 0.4 K/UL (ref 0.8–1)
MONOCYTES NFR BLD AUTO: 4.8 % (ref 1.7–9.3)
NEUTROPHILS # BLD AUTO: 7.5 K/UL (ref 1.8–7.7)
NEUTROPHILS NFR BLD AUTO: 89.1 % (ref 42.2–75.2)
PHOSPHATE SERPL-MCNC: 5.6 MG/DL (ref 2.5–4.9)
PLATELET # BLD AUTO: 325 K/UL (ref 140–450)
POTASSIUM SERPL-SCNC: 4.7 MMOL/L (ref 3.5–5.1)
RBC # BLD AUTO: 4.14 MIL/UL (ref 4.2–5.4)
SODIUM SERPL-SCNC: 139 MMOL/L (ref 136–145)
WBC # BLD AUTO: 8.4 K/UL (ref 4.8–10.8)

## 2019-06-01 RX ADMIN — INSULIN LISPRO SCH UNITS: 100 INJECTION, SOLUTION INTRAVENOUS; SUBCUTANEOUS at 11:30

## 2019-06-01 RX ADMIN — SEVELAMER CARBONATE SCH MG: 800 TABLET, FILM COATED ORAL at 10:00

## 2019-06-01 RX ADMIN — Medication SCH MG: at 12:07

## 2019-06-01 RX ADMIN — MIDODRINE HYDROCHLORIDE SCH MG: 5 TABLET ORAL at 05:00

## 2019-06-01 RX ADMIN — INSULIN LISPRO PRN UNITS: 100 INJECTION, SOLUTION INTRAVENOUS; SUBCUTANEOUS at 11:58

## 2019-06-01 RX ADMIN — Medication SCH ML: at 08:49

## 2019-06-01 RX ADMIN — FAMOTIDINE SCH MG: 20 TABLET ORAL at 08:48

## 2019-06-01 RX ADMIN — INSULIN LISPRO SCH UNITS: 100 INJECTION, SOLUTION INTRAVENOUS; SUBCUTANEOUS at 07:30

## 2019-06-01 RX ADMIN — IPRATROPIUM BROMIDE AND ALBUTEROL SULFATE SCH ML: .5; 3 SOLUTION RESPIRATORY (INHALATION) at 15:02

## 2019-06-01 RX ADMIN — MIDODRINE HYDROCHLORIDE SCH MG: 5 TABLET ORAL at 12:06

## 2019-06-01 RX ADMIN — PIPERACILLIN SODIUM AND TAZOBACTAM SODIUM SCH MLS/HR: 2; .25 INJECTION, SOLUTION INTRAVENOUS at 12:05

## 2019-06-01 RX ADMIN — SEVELAMER CARBONATE SCH MG: 800 TABLET, FILM COATED ORAL at 08:48

## 2019-06-01 RX ADMIN — PIPERACILLIN SODIUM AND TAZOBACTAM SODIUM SCH MLS/HR: 2; .25 INJECTION, SOLUTION INTRAVENOUS at 05:00

## 2019-06-01 RX ADMIN — Medication SCH ML: at 12:07

## 2019-06-01 RX ADMIN — SEVELAMER CARBONATE SCH MG: 800 TABLET, FILM COATED ORAL at 12:06

## 2019-06-01 RX ADMIN — Medication SCH MG: at 08:48

## 2019-06-01 RX ADMIN — HYDROCORTISONE SODIUM SUCCINATE SCH MG: 100 INJECTION, POWDER, FOR SOLUTION INTRAMUSCULAR; INTRAVENOUS at 12:05

## 2019-06-01 RX ADMIN — HYDROCORTISONE SODIUM SUCCINATE SCH MG: 100 INJECTION, POWDER, FOR SOLUTION INTRAMUSCULAR; INTRAVENOUS at 05:00

## 2019-06-01 RX ADMIN — Medication SCH MG: at 10:00

## 2019-06-01 RX ADMIN — Medication SCH DEV: at 07:30

## 2019-06-01 RX ADMIN — Medication SCH MG: at 07:30

## 2019-06-01 RX ADMIN — IPRATROPIUM BROMIDE AND ALBUTEROL SULFATE SCH ML: .5; 3 SOLUTION RESPIRATORY (INHALATION) at 07:57

## 2019-06-01 RX ADMIN — MUPIROCIN SCH GM: 20 OINTMENT TOPICAL at 08:51

## 2019-06-01 RX ADMIN — Medication SCH DEV: at 12:14

## 2019-06-01 NOTE — NUR
PATIENT REFUSING 0800 AND 0900 SCHEDULED MEDICATIONS. WILL ATTEMPT TO GIVE TO PT AGAIN 
LATER, IF UNABLE, WILL NOTIFY DOCTOR.

## 2019-06-01 NOTE — NUR
ENDORSED POC TO SID HILTON AT [facility].

-------------------------------------------------------------------------------

Addendum: 06/01/19 at 1544 by Randi Guidry RN

-------------------------------------------------------------------------------

PATIENT IS UNABLE TO COMPREHEND DISCHARGE TEACHING. AOX1 AND DROWSY.

## 2019-06-01 NOTE — NUR
SPOKE WITH KARMA FROM [facility], STATED PT IS GOING TO ROOM 12. AS PER KARMA, PT'S 
ROOM WILL READY AFTER 3PM TODAY. CONTACTED [facility], SPOKE WITH CURLY, STATED ETA 
FOR [facility] IS AT 3:30PM ( THEIR EARLIEST TIME). ROCHELLE-NURSE ASSIGNED MADE AWARE.

## 2019-06-01 NOTE — NUR
CALLED AND INFORMED DAUGHTER ADAM [phone] [facility] DAUGHTER VERBALIZED UNDERSTANDING.

## 2019-06-01 NOTE — NUR
PATIENT PICKED UP BY [facility], HEADED FOR [facility]. IV SITE LEFT IN PLACE, PT TO 
CONTINUE WITH IV ABX AT [facility].

## 2019-06-01 NOTE — NUR
PT IN BED SLEEPING NO S/S OF PAIN OR DISTRESS NOTED FLACC-O. IV SITE ON RIGHT HAND FLUSHED 
PATENT AND IS RUNNING D5 1/2NS AT 10 TO KVO. V/S AS FOLLOWS T 97.0 P 73 R 18 B/P 110/43 02 
96% ON ROOM AIR.

## 2019-06-01 NOTE — NUR
RECEIVED BEDSIDE REPORT FROM NIGHT SHIFT RN FOR CONTINUITY OF CARE. PT IN STABLE CONDITION. 
LETHARGIC BUT AROUSABLE BY TOUCH/VOICE. NO C/O PAIN AND DISCOMFORT. NO S/S DISTRESS. 
RESPIRATIONS EVEN AND UNLABORED. HEART RHYTHM REGULAR. ACTIVE BS IN ALL QUADRANTS. SCABS 
THROUGHOUT. S/P I&D OF LEFT NECK AND RIGHT UA- DRESSINGS C/D/I. PT IS BEDBOUND. ANURIC, 
ESRD. IV SITE PATENT AND ASYMPTOMATIC, TKO PER MD ORDERS. ALL SAFETY PRECAUTIONS IN PLACE, 
WILL CONTINUE TO MONITOR.

## 2022-06-21 NOTE — NUR
RECEIVED REPORT FROM NIGHT SHIFT NURSE AT BEDSIDE, PT IS AAOX1, FOLLOW COMMANDS SOMETIMES,  
VSS, FLACC 0, NO S/S OF DISTRESS, CLEAR LUNG SOUNDS YURIY, ON RA, O2 SAT AT 94%, SR ON CARDIAC 
MONITOR, SOFT ABDOMEN WITH ACTIVE BOWEL SOUNDS, ON PUREE DIET, ANURIA, HD MWF, AV SHUNT TO 
LEFT UPPER ARM,  SKIN IS WARM AND DRY TO TOUCH, MULTIPLE OPEN WOUNDS WITH SCABS NOTED,  
RIGHT SIDE WEAKNESS NOTED, CENTRAL LINE TO RIJ, TLC, PATENT, RUNNING LEVOPHED AT 4MCG/MIN, 
AND NS AT 30ML/HR.  HOB ELEVATED TO 30 DEGREES, SAFETY MEASURES IN PLACE, POSITION CHANGED 
FOR OFF LOAD PRESSURE, WILL CONTINUE TO MONITOR. Hourly Rounding
